# Patient Record
Sex: FEMALE | Race: NATIVE HAWAIIAN OR OTHER PACIFIC ISLANDER
[De-identification: names, ages, dates, MRNs, and addresses within clinical notes are randomized per-mention and may not be internally consistent; named-entity substitution may affect disease eponyms.]

---

## 2017-06-26 ENCOUNTER — HOSPITAL ENCOUNTER (OUTPATIENT)
Dept: HOSPITAL 67 - LABW | Age: 67
Discharge: HOME | End: 2017-06-26
Attending: INTERNAL MEDICINE
Payer: COMMERCIAL

## 2017-06-26 DIAGNOSIS — E11.9: Primary | ICD-10-CM

## 2017-06-26 DIAGNOSIS — E55.9: ICD-10-CM

## 2017-06-26 LAB
LDLC SERPL-MCNC: 102 MG/DL (ref 0–?)
PLATELET # BLD: 264 K/UL (ref 152–353)
POTASSIUM SERPL-SCNC: 4 MMOL/L (ref 3.6–5.2)
SODIUM SERPL-SCNC: 141 MMOL/L (ref 136–145)

## 2017-06-26 PROCEDURE — 85027 COMPLETE CBC AUTOMATED: CPT

## 2017-06-26 PROCEDURE — 82043 UR ALBUMIN QUANTITATIVE: CPT

## 2017-06-26 PROCEDURE — 82306 VITAMIN D 25 HYDROXY: CPT

## 2017-06-26 PROCEDURE — 81000 URINALYSIS NONAUTO W/SCOPE: CPT

## 2017-06-26 PROCEDURE — 80053 COMPREHEN METABOLIC PANEL: CPT

## 2017-06-26 PROCEDURE — 84443 ASSAY THYROID STIM HORMONE: CPT

## 2017-06-26 PROCEDURE — 80061 LIPID PANEL: CPT

## 2017-06-26 PROCEDURE — 83036 HEMOGLOBIN GLYCOSYLATED A1C: CPT

## 2017-06-26 PROCEDURE — 82570 ASSAY OF URINE CREATININE: CPT

## 2017-06-26 PROCEDURE — 36415 COLL VENOUS BLD VENIPUNCTURE: CPT

## 2017-07-10 ENCOUNTER — HOSPITAL ENCOUNTER (OUTPATIENT)
Dept: HOSPITAL 67 - MAMMO | Age: 67
Discharge: HOME | End: 2017-07-10
Attending: INTERNAL MEDICINE
Payer: COMMERCIAL

## 2017-07-10 DIAGNOSIS — M85.88: ICD-10-CM

## 2017-07-10 DIAGNOSIS — Z12.31: Primary | ICD-10-CM

## 2017-08-07 ENCOUNTER — HOSPITAL ENCOUNTER (OUTPATIENT)
Dept: HOSPITAL 67 - US | Age: 67
Discharge: HOME | End: 2017-08-07
Attending: INTERNAL MEDICINE
Payer: COMMERCIAL

## 2017-08-07 DIAGNOSIS — M79.604: Primary | ICD-10-CM

## 2017-08-22 ENCOUNTER — HOSPITAL ENCOUNTER (OUTPATIENT)
Dept: HOSPITAL 67 - INF | Age: 67
Discharge: HOME | End: 2017-08-22
Attending: INTERNAL MEDICINE
Payer: COMMERCIAL

## 2017-08-22 VITALS — WEIGHT: 181 LBS | BODY MASS INDEX: 33.31 KG/M2 | HEIGHT: 62 IN

## 2017-08-22 DIAGNOSIS — E86.0: Primary | ICD-10-CM

## 2017-08-22 LAB
PLATELET # BLD: 230 K/UL (ref 152–353)
POTASSIUM SERPL-SCNC: 3.4 MMOL/L (ref 3.6–5.2)
SODIUM SERPL-SCNC: 138 MMOL/L (ref 136–145)

## 2017-08-22 PROCEDURE — 96366 THER/PROPH/DIAG IV INF ADDON: CPT

## 2017-08-22 PROCEDURE — 82150 ASSAY OF AMYLASE: CPT

## 2017-08-22 PROCEDURE — 80053 COMPREHEN METABOLIC PANEL: CPT

## 2017-08-22 PROCEDURE — 85027 COMPLETE CBC AUTOMATED: CPT

## 2017-08-22 PROCEDURE — 96361 HYDRATE IV INFUSION ADD-ON: CPT

## 2017-08-22 PROCEDURE — 96376 TX/PRO/DX INJ SAME DRUG ADON: CPT

## 2017-08-22 PROCEDURE — 81000 URINALYSIS NONAUTO W/SCOPE: CPT

## 2017-08-22 PROCEDURE — 36591 DRAW BLOOD OFF VENOUS DEVICE: CPT

## 2017-08-22 PROCEDURE — 96375 TX/PRO/DX INJ NEW DRUG ADDON: CPT

## 2017-08-22 PROCEDURE — 83690 ASSAY OF LIPASE: CPT

## 2017-08-22 PROCEDURE — 96365 THER/PROPH/DIAG IV INF INIT: CPT

## 2017-08-25 ENCOUNTER — HOSPITAL ENCOUNTER (OUTPATIENT)
Dept: HOSPITAL 24 - RAD | Age: 67
End: 2017-08-25
Attending: INTERNAL MEDICINE
Payer: COMMERCIAL

## 2017-08-25 DIAGNOSIS — R10.84: Primary | ICD-10-CM

## 2017-08-25 LAB
BUN SERPL-MCNC: 11 MG/DL (ref 7–18)
CREAT SERPL-MCNC: 0.69 MG/DL (ref 0.55–1.02)

## 2017-08-25 PROCEDURE — 74177 CT ABD & PELVIS W/CONTRAST: CPT

## 2017-08-25 PROCEDURE — 84520 ASSAY OF UREA NITROGEN: CPT

## 2017-08-25 PROCEDURE — 36415 COLL VENOUS BLD VENIPUNCTURE: CPT

## 2017-08-25 PROCEDURE — 82565 ASSAY OF CREATININE: CPT

## 2017-08-25 NOTE — CT
CT abdomen and pelvis with contrast



Indication: Abdominal pain, nausea and vomiting



Technique: Helical CT images of the abdomen and pelvis were obtained with IV contrast. Reformatted im
ages in the coronal and sagittal planes were also generated for review.



Comparison: None



Findings: There is a 3 mm nodule within the left lower lobe (axial image 2, series 5). No aggressive 
osseous lesions are identified.



The gallbladder is surgically absent. There are multiple hypoenhancing lesions throughout the left an
d right hepatic lobes, compatible with metastases. There is a 3.0 x 2.5 cm hypoenhancing mass with pa
ncreatic head/neck junction with moderate associated atrophy of the pancreatic body and tail as well 
as mild pancreatic duct dilatation. The lesion partially encapsulates and narrows the splenic vein ne
ar the confluence without associated occlusion. The portal vein and SMV are patent. No arterial vascu
lar invasion is seen.



There is a small indeterminate focus of hypoattenuation within the spleen (image 17, series 4). The a
drenal glands are unremarkable. Small hypoattenuating lesions within the upper poles of both kidneys 
are too small to characterize but likely reflects cysts. Prior partial colectomy changes are noted. T
he remaining GI tract, including the appendix is normal. There is moderate calcification of the nonan
eurysmal aortoiliac system. The urinary bladder is normal. The patient is post hysterectomy. No free 
air, free fluid or lymphadenopathy is identified.



Impression: 

1. Hypoenhancing 3 cm lesion within the pancreatic head/neck junction is most compatible with pancrea
tic adenocarcinoma. The lesion partially encapsulates and narrows the splenic vein near the portal co
nfluence without associated occlusion or thrombosis.

2. Multiple hypoenhancing hepatic lesions, compatible with metastases.

3. Indeterminate 3 mm nodule within the left lower lobe, possibly reflecting metastatic disease. Rich
mmend further evaluation with contrast enhanced CT chest.

4. Hypoattenuating focus within the spleen is also indeterminate but suspicious for metastasis.







Reported By:Electronically Signed by BANDAR RODGERS MD at 8/25/2017 2:12:56 PM

## 2017-08-29 ENCOUNTER — HOSPITAL ENCOUNTER (OUTPATIENT)
Dept: HOSPITAL 67 - MRI | Age: 67
Discharge: HOME | End: 2017-08-29
Attending: SPECIALIST
Payer: COMMERCIAL

## 2017-08-29 DIAGNOSIS — M54.5: Primary | ICD-10-CM

## 2017-08-29 DIAGNOSIS — M48.06: ICD-10-CM

## 2017-09-06 ENCOUNTER — HOSPITAL ENCOUNTER (OUTPATIENT)
Dept: HOSPITAL 67 - LABW | Age: 67
Discharge: HOME | End: 2017-09-06
Attending: INTERNAL MEDICINE
Payer: COMMERCIAL

## 2017-09-06 DIAGNOSIS — C25.9: Primary | ICD-10-CM

## 2017-09-06 PROCEDURE — 86316 IMMUNOASSAY TUMOR OTHER: CPT

## 2017-09-06 PROCEDURE — 80076 HEPATIC FUNCTION PANEL: CPT

## 2017-09-06 PROCEDURE — 36415 COLL VENOUS BLD VENIPUNCTURE: CPT

## 2017-10-02 ENCOUNTER — HOSPITAL ENCOUNTER (OUTPATIENT)
Dept: HOSPITAL 67 - LABW | Age: 67
Discharge: HOME | End: 2017-10-02
Payer: COMMERCIAL

## 2017-10-02 DIAGNOSIS — C25.0: Primary | ICD-10-CM

## 2017-10-02 LAB
PLATELET # BLD: 185 K/UL (ref 152–353)
POTASSIUM SERPL-SCNC: 3.3 MMOL/L (ref 3.6–5.2)
SODIUM SERPL-SCNC: 137 MMOL/L (ref 136–145)

## 2017-10-02 PROCEDURE — 80053 COMPREHEN METABOLIC PANEL: CPT

## 2017-10-02 PROCEDURE — 36415 COLL VENOUS BLD VENIPUNCTURE: CPT

## 2017-10-02 PROCEDURE — 85027 COMPLETE CBC AUTOMATED: CPT

## 2017-10-16 ENCOUNTER — HOSPITAL ENCOUNTER (OUTPATIENT)
Dept: HOSPITAL 67 - LABW | Age: 67
Discharge: HOME | End: 2017-10-16
Payer: COMMERCIAL

## 2017-10-16 DIAGNOSIS — C25.0: Primary | ICD-10-CM

## 2017-10-16 LAB
PLATELET # BLD: 189 K/UL (ref 152–353)
POTASSIUM SERPL-SCNC: 4.2 MMOL/L (ref 3.6–5.2)
SODIUM SERPL-SCNC: 137 MMOL/L (ref 136–145)

## 2017-10-16 PROCEDURE — 85027 COMPLETE CBC AUTOMATED: CPT

## 2017-10-16 PROCEDURE — 80053 COMPREHEN METABOLIC PANEL: CPT

## 2017-10-16 PROCEDURE — 36415 COLL VENOUS BLD VENIPUNCTURE: CPT

## 2017-10-17 ENCOUNTER — HOSPITAL ENCOUNTER (OUTPATIENT)
Dept: HOSPITAL 67 - LABW | Age: 67
Discharge: HOME | End: 2017-10-17
Attending: INTERNAL MEDICINE
Payer: COMMERCIAL

## 2017-10-17 DIAGNOSIS — Z00.00: ICD-10-CM

## 2017-10-17 DIAGNOSIS — E11.9: Primary | ICD-10-CM

## 2017-10-17 LAB
LDLC SERPL-MCNC: 52 MG/DL (ref 0–?)
PLATELET # BLD: 232 K/UL (ref 152–353)
POTASSIUM SERPL-SCNC: 3.6 MMOL/L (ref 3.6–5.2)
SODIUM SERPL-SCNC: 140 MMOL/L (ref 136–145)

## 2017-10-17 PROCEDURE — 83036 HEMOGLOBIN GLYCOSYLATED A1C: CPT

## 2017-10-17 PROCEDURE — 84439 ASSAY OF FREE THYROXINE: CPT

## 2017-10-17 PROCEDURE — 80061 LIPID PANEL: CPT

## 2017-10-17 PROCEDURE — 85027 COMPLETE CBC AUTOMATED: CPT

## 2017-10-17 PROCEDURE — 36415 COLL VENOUS BLD VENIPUNCTURE: CPT

## 2017-10-17 PROCEDURE — 80053 COMPREHEN METABOLIC PANEL: CPT

## 2017-10-17 PROCEDURE — 81000 URINALYSIS NONAUTO W/SCOPE: CPT

## 2017-10-17 PROCEDURE — 84443 ASSAY THYROID STIM HORMONE: CPT

## 2017-10-31 ENCOUNTER — HOSPITAL ENCOUNTER (OUTPATIENT)
Dept: HOSPITAL 67 - LABW | Age: 67
Discharge: HOME | End: 2017-10-31
Payer: COMMERCIAL

## 2017-10-31 DIAGNOSIS — R19.7: ICD-10-CM

## 2017-10-31 DIAGNOSIS — C25.0: Primary | ICD-10-CM

## 2017-10-31 LAB
PLATELET # BLD: 105 K/UL (ref 152–353)
POTASSIUM SERPL-SCNC: 3.8 MMOL/L (ref 3.6–5.2)
SODIUM SERPL-SCNC: 135 MMOL/L (ref 136–145)

## 2017-10-31 PROCEDURE — 80053 COMPREHEN METABOLIC PANEL: CPT

## 2017-10-31 PROCEDURE — 36415 COLL VENOUS BLD VENIPUNCTURE: CPT

## 2017-10-31 PROCEDURE — 85027 COMPLETE CBC AUTOMATED: CPT

## 2017-11-27 ENCOUNTER — HOSPITAL ENCOUNTER (OUTPATIENT)
Dept: HOSPITAL 67 - LABW | Age: 67
Discharge: HOME | End: 2017-11-27
Payer: COMMERCIAL

## 2017-11-27 DIAGNOSIS — C25.0: Primary | ICD-10-CM

## 2017-11-27 LAB
PLATELET # BLD: 93 K/UL (ref 152–353)
POTASSIUM SERPL-SCNC: 4.2 MMOL/L (ref 3.6–5.2)
SODIUM SERPL-SCNC: 135 MMOL/L (ref 136–145)

## 2017-11-27 PROCEDURE — 85027 COMPLETE CBC AUTOMATED: CPT

## 2017-11-27 PROCEDURE — 36415 COLL VENOUS BLD VENIPUNCTURE: CPT

## 2017-11-27 PROCEDURE — 86316 IMMUNOASSAY TUMOR OTHER: CPT

## 2017-11-27 PROCEDURE — 80053 COMPREHEN METABOLIC PANEL: CPT

## 2017-12-11 ENCOUNTER — HOSPITAL ENCOUNTER (OUTPATIENT)
Dept: HOSPITAL 67 - LABW | Age: 67
Discharge: HOME | End: 2017-12-11
Payer: COMMERCIAL

## 2017-12-11 DIAGNOSIS — C25.0: Primary | ICD-10-CM

## 2017-12-11 LAB
PLATELET # BLD: 52 K/UL (ref 152–353)
POTASSIUM SERPL-SCNC: 4.1 MMOL/L (ref 3.6–5.2)
SODIUM SERPL-SCNC: 137 MMOL/L (ref 136–145)

## 2017-12-11 PROCEDURE — 36415 COLL VENOUS BLD VENIPUNCTURE: CPT

## 2017-12-11 PROCEDURE — 80053 COMPREHEN METABOLIC PANEL: CPT

## 2017-12-11 PROCEDURE — 85027 COMPLETE CBC AUTOMATED: CPT

## 2017-12-11 PROCEDURE — 85007 BL SMEAR W/DIFF WBC COUNT: CPT

## 2017-12-26 ENCOUNTER — HOSPITAL ENCOUNTER (OUTPATIENT)
Dept: HOSPITAL 67 - LABW | Age: 67
Discharge: HOME | End: 2017-12-26
Payer: COMMERCIAL

## 2017-12-26 DIAGNOSIS — C25.0: Primary | ICD-10-CM

## 2017-12-26 LAB
PLATELET # BLD: 57 K/UL (ref 152–353)
POTASSIUM SERPL-SCNC: 3.7 MMOL/L (ref 3.6–5.2)
SODIUM SERPL-SCNC: 134 MMOL/L (ref 136–145)

## 2017-12-26 PROCEDURE — 80053 COMPREHEN METABOLIC PANEL: CPT

## 2017-12-26 PROCEDURE — 85027 COMPLETE CBC AUTOMATED: CPT

## 2017-12-26 PROCEDURE — 36415 COLL VENOUS BLD VENIPUNCTURE: CPT

## 2018-01-15 ENCOUNTER — HOSPITAL ENCOUNTER (OUTPATIENT)
Dept: HOSPITAL 67 - LABW | Age: 68
Discharge: HOME | End: 2018-01-15
Payer: COMMERCIAL

## 2018-01-15 DIAGNOSIS — I87.2: Primary | ICD-10-CM

## 2018-01-15 LAB
PLATELET # BLD: 121 K/UL (ref 152–353)
POTASSIUM SERPL-SCNC: 3.8 MMOL/L (ref 3.6–5.2)
SODIUM SERPL-SCNC: 135 MMOL/L (ref 136–145)

## 2018-01-15 PROCEDURE — 85027 COMPLETE CBC AUTOMATED: CPT

## 2018-01-15 PROCEDURE — 86316 IMMUNOASSAY TUMOR OTHER: CPT

## 2018-01-15 PROCEDURE — 80053 COMPREHEN METABOLIC PANEL: CPT

## 2018-01-15 PROCEDURE — 36415 COLL VENOUS BLD VENIPUNCTURE: CPT

## 2018-01-30 ENCOUNTER — HOSPITAL ENCOUNTER (OUTPATIENT)
Dept: HOSPITAL 67 - LABW | Age: 68
Discharge: HOME | End: 2018-01-30
Payer: COMMERCIAL

## 2018-01-30 DIAGNOSIS — C25.0: Primary | ICD-10-CM

## 2018-01-30 LAB
PLATELET # BLD: 50 K/UL (ref 152–353)
POTASSIUM SERPL-SCNC: 3.7 MMOL/L (ref 3.6–5.2)
SODIUM SERPL-SCNC: 137 MMOL/L (ref 136–145)

## 2018-01-30 PROCEDURE — 85027 COMPLETE CBC AUTOMATED: CPT

## 2018-01-30 PROCEDURE — 36415 COLL VENOUS BLD VENIPUNCTURE: CPT

## 2018-01-30 PROCEDURE — 80053 COMPREHEN METABOLIC PANEL: CPT

## 2018-02-13 ENCOUNTER — HOSPITAL ENCOUNTER (OUTPATIENT)
Dept: HOSPITAL 67 - LABW | Age: 68
Discharge: HOME | End: 2018-02-13
Payer: COMMERCIAL

## 2018-02-13 DIAGNOSIS — C25.0: Primary | ICD-10-CM

## 2018-02-13 LAB
PLATELET # BLD: 62 K/UL (ref 152–353)
POTASSIUM SERPL-SCNC: 3.6 MMOL/L (ref 3.6–5.2)
SODIUM SERPL-SCNC: 135 MMOL/L (ref 136–145)

## 2018-02-13 PROCEDURE — 36415 COLL VENOUS BLD VENIPUNCTURE: CPT

## 2018-02-13 PROCEDURE — 85027 COMPLETE CBC AUTOMATED: CPT

## 2018-02-13 PROCEDURE — 80053 COMPREHEN METABOLIC PANEL: CPT

## 2018-02-26 ENCOUNTER — HOSPITAL ENCOUNTER (OUTPATIENT)
Dept: HOSPITAL 67 - LABW | Age: 68
Discharge: HOME | End: 2018-02-26
Payer: COMMERCIAL

## 2018-02-26 DIAGNOSIS — C25.0: Primary | ICD-10-CM

## 2018-02-26 LAB
PLATELET # BLD: 36 K/UL (ref 152–353)
POTASSIUM SERPL-SCNC: 2.9 MMOL/L (ref 3.6–5.2)
SODIUM SERPL-SCNC: 135 MMOL/L (ref 136–145)

## 2018-02-26 PROCEDURE — 36415 COLL VENOUS BLD VENIPUNCTURE: CPT

## 2018-02-26 PROCEDURE — 85027 COMPLETE CBC AUTOMATED: CPT

## 2018-02-26 PROCEDURE — 80053 COMPREHEN METABOLIC PANEL: CPT

## 2018-03-05 ENCOUNTER — HOSPITAL ENCOUNTER (OUTPATIENT)
Dept: HOSPITAL 67 - LABW | Age: 68
Discharge: HOME | End: 2018-03-05
Payer: COMMERCIAL

## 2018-03-05 DIAGNOSIS — C25.0: Primary | ICD-10-CM

## 2018-03-05 LAB
PLATELET # BLD: 117 K/UL (ref 152–353)
POTASSIUM SERPL-SCNC: 2.7 MMOL/L (ref 3.6–5.2)
SODIUM SERPL-SCNC: 136 MMOL/L (ref 136–145)

## 2018-03-05 PROCEDURE — 36415 COLL VENOUS BLD VENIPUNCTURE: CPT

## 2018-03-05 PROCEDURE — 80053 COMPREHEN METABOLIC PANEL: CPT

## 2018-03-05 PROCEDURE — 85027 COMPLETE CBC AUTOMATED: CPT

## 2018-03-13 ENCOUNTER — HOSPITAL ENCOUNTER (OUTPATIENT)
Dept: HOSPITAL 67 - LAB | Age: 68
Discharge: HOME | End: 2018-03-13
Attending: PHYSICIAN ASSISTANT
Payer: COMMERCIAL

## 2018-03-13 DIAGNOSIS — R19.7: Primary | ICD-10-CM

## 2018-03-13 PROCEDURE — 87015 SPECIMEN INFECT AGNT CONCNTJ: CPT

## 2018-03-13 PROCEDURE — 87899 AGENT NOS ASSAY W/OPTIC: CPT

## 2018-03-13 PROCEDURE — 87328 CRYPTOSPORIDIUM AG IA: CPT

## 2018-03-13 PROCEDURE — 87329 GIARDIA AG IA: CPT

## 2018-03-13 PROCEDURE — 87045 FECES CULTURE AEROBIC BACT: CPT

## 2018-03-13 PROCEDURE — 87324 CLOSTRIDIUM AG IA: CPT

## 2018-03-13 PROCEDURE — 87449 NOS EACH ORGANISM AG IA: CPT

## 2018-03-14 ENCOUNTER — HOSPITAL ENCOUNTER (OUTPATIENT)
Dept: HOSPITAL 67 - LABW | Age: 68
LOS: 1 days | Discharge: HOME | End: 2018-03-15
Payer: COMMERCIAL

## 2018-03-14 DIAGNOSIS — C25.0: Primary | ICD-10-CM

## 2018-03-14 LAB
PLATELET # BLD: 40 K/UL (ref 152–353)
POTASSIUM SERPL-SCNC: 3.6 MMOL/L (ref 3.6–5.2)
SODIUM SERPL-SCNC: 133 MMOL/L (ref 136–145)

## 2018-03-14 PROCEDURE — 36415 COLL VENOUS BLD VENIPUNCTURE: CPT

## 2018-03-14 PROCEDURE — 85027 COMPLETE CBC AUTOMATED: CPT

## 2018-03-14 PROCEDURE — 80053 COMPREHEN METABOLIC PANEL: CPT

## 2018-03-16 ENCOUNTER — HOSPITAL ENCOUNTER (OUTPATIENT)
Dept: HOSPITAL 67 - INF | Age: 68
Discharge: HOME | End: 2018-03-16
Attending: INTERNAL MEDICINE
Payer: COMMERCIAL

## 2018-03-16 VITALS — WEIGHT: 163 LBS | HEIGHT: 62 IN | BODY MASS INDEX: 30 KG/M2

## 2018-03-16 DIAGNOSIS — E87.8: Primary | ICD-10-CM

## 2018-03-16 PROCEDURE — 96365 THER/PROPH/DIAG IV INF INIT: CPT

## 2018-03-16 PROCEDURE — 96366 THER/PROPH/DIAG IV INF ADDON: CPT

## 2018-03-28 ENCOUNTER — HOSPITAL ENCOUNTER (OUTPATIENT)
Dept: HOSPITAL 67 - LABW | Age: 68
Discharge: HOME | End: 2018-03-28
Payer: COMMERCIAL

## 2018-03-28 DIAGNOSIS — C25.0: Primary | ICD-10-CM

## 2018-03-28 LAB
PLATELET # BLD: 189 K/UL (ref 152–353)
POTASSIUM SERPL-SCNC: 2.6 MMOL/L (ref 3.6–5.2)
SODIUM SERPL-SCNC: 136 MMOL/L (ref 136–145)

## 2018-03-28 PROCEDURE — 36415 COLL VENOUS BLD VENIPUNCTURE: CPT

## 2018-03-28 PROCEDURE — 80053 COMPREHEN METABOLIC PANEL: CPT

## 2018-03-28 PROCEDURE — 85027 COMPLETE CBC AUTOMATED: CPT

## 2018-04-19 ENCOUNTER — HOSPITAL ENCOUNTER (OUTPATIENT)
Dept: HOSPITAL 67 - NM | Age: 68
Discharge: HOME | End: 2018-04-19
Attending: INTERNAL MEDICINE
Payer: COMMERCIAL

## 2018-04-19 DIAGNOSIS — C25.0: ICD-10-CM

## 2018-04-19 DIAGNOSIS — M79.671: Primary | ICD-10-CM

## 2018-04-19 PROCEDURE — A9561 TC99M OXIDRONATE: HCPCS

## 2018-05-28 ENCOUNTER — HOSPITAL ENCOUNTER (OUTPATIENT)
Dept: HOSPITAL 67 - LABW | Age: 68
Discharge: HOME | End: 2018-05-28
Payer: COMMERCIAL

## 2018-05-28 DIAGNOSIS — C25.0: Primary | ICD-10-CM

## 2018-05-28 LAB
PLATELET # BLD: 219 K/UL (ref 152–353)
POTASSIUM SERPL-SCNC: 4.5 MMOL/L (ref 3.6–5.2)
SODIUM SERPL-SCNC: 138 MMOL/L (ref 136–145)

## 2018-05-28 PROCEDURE — 86316 IMMUNOASSAY TUMOR OTHER: CPT

## 2018-05-28 PROCEDURE — 36415 COLL VENOUS BLD VENIPUNCTURE: CPT

## 2018-05-28 PROCEDURE — 85027 COMPLETE CBC AUTOMATED: CPT

## 2018-05-28 PROCEDURE — 80053 COMPREHEN METABOLIC PANEL: CPT

## 2018-06-25 ENCOUNTER — HOSPITAL ENCOUNTER (OUTPATIENT)
Dept: HOSPITAL 67 - LABW | Age: 68
Discharge: HOME | End: 2018-06-25
Attending: INTERNAL MEDICINE
Payer: COMMERCIAL

## 2018-06-25 DIAGNOSIS — E11.9: Primary | ICD-10-CM

## 2018-06-25 LAB
LDLC SERPL-MCNC: 75 MG/DL (ref 0–?)
PLATELET # BLD: 222 K/UL (ref 152–353)
POTASSIUM SERPL-SCNC: 3.8 MMOL/L (ref 3.6–5.2)
SODIUM SERPL-SCNC: 139 MMOL/L (ref 136–145)

## 2018-06-25 PROCEDURE — 84443 ASSAY THYROID STIM HORMONE: CPT

## 2018-06-25 PROCEDURE — 80061 LIPID PANEL: CPT

## 2018-06-25 PROCEDURE — 84439 ASSAY OF FREE THYROXINE: CPT

## 2018-06-25 PROCEDURE — 81000 URINALYSIS NONAUTO W/SCOPE: CPT

## 2018-06-25 PROCEDURE — 36415 COLL VENOUS BLD VENIPUNCTURE: CPT

## 2018-06-25 PROCEDURE — 83036 HEMOGLOBIN GLYCOSYLATED A1C: CPT

## 2018-06-25 PROCEDURE — 80053 COMPREHEN METABOLIC PANEL: CPT

## 2018-06-25 PROCEDURE — 85027 COMPLETE CBC AUTOMATED: CPT

## 2018-07-06 ENCOUNTER — HOSPITAL ENCOUNTER (OUTPATIENT)
Dept: HOSPITAL 67 - INF | Age: 68
Discharge: HOME | End: 2018-07-06
Attending: FAMILY MEDICINE
Payer: COMMERCIAL

## 2018-07-06 DIAGNOSIS — Z95.828: Primary | ICD-10-CM

## 2018-07-06 PROCEDURE — 96374 THER/PROPH/DIAG INJ IV PUSH: CPT

## 2018-07-12 ENCOUNTER — HOSPITAL ENCOUNTER (OUTPATIENT)
Dept: HOSPITAL 67 - MAMMO | Age: 68
Discharge: HOME | End: 2018-07-12
Attending: INTERNAL MEDICINE
Payer: COMMERCIAL

## 2018-07-12 DIAGNOSIS — Z12.31: Primary | ICD-10-CM

## 2018-08-09 ENCOUNTER — HOSPITAL ENCOUNTER (OUTPATIENT)
Dept: HOSPITAL 67 - INF | Age: 68
Discharge: HOME | End: 2018-08-09
Attending: FAMILY MEDICINE
Payer: COMMERCIAL

## 2018-08-09 DIAGNOSIS — Z95.828: Primary | ICD-10-CM

## 2018-08-09 PROCEDURE — 96374 THER/PROPH/DIAG INJ IV PUSH: CPT

## 2018-08-31 ENCOUNTER — HOSPITAL ENCOUNTER (EMERGENCY)
Dept: HOSPITAL 67 - ED | Age: 68
Discharge: HOME | End: 2018-08-31
Payer: COMMERCIAL

## 2018-08-31 VITALS — HEIGHT: 64 IN | WEIGHT: 160 LBS | BODY MASS INDEX: 27.31 KG/M2

## 2018-08-31 VITALS — SYSTOLIC BLOOD PRESSURE: 122 MMHG | DIASTOLIC BLOOD PRESSURE: 50 MMHG

## 2018-08-31 VITALS — TEMPERATURE: 97.8 F

## 2018-08-31 DIAGNOSIS — Z79.899: ICD-10-CM

## 2018-08-31 DIAGNOSIS — G45.8: Primary | ICD-10-CM

## 2018-08-31 DIAGNOSIS — R00.1: ICD-10-CM

## 2018-08-31 DIAGNOSIS — Z51.81: ICD-10-CM

## 2018-08-31 LAB
APTT BLD: 22.3 SECONDS (ref 24.5–33.6)
PLATELET # BLD: 192 K/UL (ref 152–353)
POTASSIUM SERPL-SCNC: 4.8 MMOL/L (ref 3.6–5.2)
SODIUM SERPL-SCNC: 139 MMOL/L (ref 136–145)

## 2018-08-31 PROCEDURE — 85027 COMPLETE CBC AUTOMATED: CPT

## 2018-08-31 PROCEDURE — 85730 THROMBOPLASTIN TIME PARTIAL: CPT

## 2018-08-31 PROCEDURE — 80053 COMPREHEN METABOLIC PANEL: CPT

## 2018-08-31 PROCEDURE — 99283 EMERGENCY DEPT VISIT LOW MDM: CPT

## 2018-08-31 PROCEDURE — 36415 COLL VENOUS BLD VENIPUNCTURE: CPT

## 2018-08-31 PROCEDURE — 85379 FIBRIN DEGRADATION QUANT: CPT

## 2018-08-31 PROCEDURE — 93005 ELECTROCARDIOGRAM TRACING: CPT

## 2018-08-31 PROCEDURE — 85610 PROTHROMBIN TIME: CPT

## 2018-09-11 ENCOUNTER — HOSPITAL ENCOUNTER (OUTPATIENT)
Dept: HOSPITAL 67 - RESP | Age: 68
Discharge: HOME | End: 2018-09-11
Attending: INTERNAL MEDICINE
Payer: COMMERCIAL

## 2018-09-11 DIAGNOSIS — G45.9: Primary | ICD-10-CM

## 2018-09-11 PROCEDURE — 93306 TTE W/DOPPLER COMPLETE: CPT

## 2018-09-14 ENCOUNTER — HOSPITAL ENCOUNTER (OUTPATIENT)
Dept: HOSPITAL 67 - MRI | Age: 68
Discharge: HOME | End: 2018-09-14
Attending: INTERNAL MEDICINE
Payer: COMMERCIAL

## 2018-09-14 DIAGNOSIS — G45.9: Primary | ICD-10-CM

## 2018-10-04 ENCOUNTER — HOSPITAL ENCOUNTER (OUTPATIENT)
Dept: HOSPITAL 67 - LABW | Age: 68
Discharge: HOME | End: 2018-10-04
Attending: INTERNAL MEDICINE
Payer: COMMERCIAL

## 2018-10-04 DIAGNOSIS — C25.0: Primary | ICD-10-CM

## 2018-10-04 LAB
PLATELET # BLD: 175 K/UL (ref 152–353)
POTASSIUM SERPL-SCNC: 4.5 MMOL/L (ref 3.6–5.2)
SODIUM SERPL-SCNC: 140 MMOL/L (ref 136–145)

## 2018-10-04 PROCEDURE — 36415 COLL VENOUS BLD VENIPUNCTURE: CPT

## 2018-10-04 PROCEDURE — 85027 COMPLETE CBC AUTOMATED: CPT

## 2018-10-04 PROCEDURE — 86316 IMMUNOASSAY TUMOR OTHER: CPT

## 2018-10-04 PROCEDURE — 80053 COMPREHEN METABOLIC PANEL: CPT

## 2018-10-20 ENCOUNTER — HOSPITAL ENCOUNTER (EMERGENCY)
Dept: HOSPITAL 67 - ED | Age: 68
Discharge: HOME | End: 2018-10-20
Payer: COMMERCIAL

## 2018-10-20 VITALS — HEIGHT: 62 IN | WEIGHT: 157 LBS | BODY MASS INDEX: 28.89 KG/M2

## 2018-10-20 VITALS — TEMPERATURE: 98.1 F | SYSTOLIC BLOOD PRESSURE: 105 MMHG | DIASTOLIC BLOOD PRESSURE: 43 MMHG

## 2018-10-20 DIAGNOSIS — R31.9: ICD-10-CM

## 2018-10-20 DIAGNOSIS — R10.32: Primary | ICD-10-CM

## 2018-10-20 DIAGNOSIS — K59.09: ICD-10-CM

## 2018-10-20 PROCEDURE — 99283 EMERGENCY DEPT VISIT LOW MDM: CPT

## 2018-10-20 PROCEDURE — 81000 URINALYSIS NONAUTO W/SCOPE: CPT

## 2018-10-26 ENCOUNTER — HOSPITAL ENCOUNTER (EMERGENCY)
Dept: HOSPITAL 67 - ED | Age: 68
Discharge: HOME | End: 2018-10-26
Payer: COMMERCIAL

## 2018-10-26 VITALS — SYSTOLIC BLOOD PRESSURE: 112 MMHG | DIASTOLIC BLOOD PRESSURE: 59 MMHG

## 2018-10-26 VITALS — TEMPERATURE: 97.9 F

## 2018-10-26 VITALS — BODY MASS INDEX: 29.08 KG/M2 | WEIGHT: 158 LBS | HEIGHT: 62 IN

## 2018-10-26 DIAGNOSIS — E11.9: ICD-10-CM

## 2018-10-26 DIAGNOSIS — R50.9: Primary | ICD-10-CM

## 2018-10-26 DIAGNOSIS — C25.7: ICD-10-CM

## 2018-10-26 LAB
PLATELET # BLD: 97 K/UL (ref 152–353)
POTASSIUM SERPL-SCNC: 4.6 MMOL/L (ref 3.6–5.2)
SODIUM SERPL-SCNC: 134 MMOL/L (ref 136–145)

## 2018-10-26 PROCEDURE — 99283 EMERGENCY DEPT VISIT LOW MDM: CPT

## 2018-10-26 PROCEDURE — 83605 ASSAY OF LACTIC ACID: CPT

## 2018-10-26 PROCEDURE — 81000 URINALYSIS NONAUTO W/SCOPE: CPT

## 2018-10-26 PROCEDURE — 36415 COLL VENOUS BLD VENIPUNCTURE: CPT

## 2018-10-26 PROCEDURE — 80053 COMPREHEN METABOLIC PANEL: CPT

## 2018-10-26 PROCEDURE — 85027 COMPLETE CBC AUTOMATED: CPT

## 2018-11-06 ENCOUNTER — HOSPITAL ENCOUNTER (OUTPATIENT)
Dept: HOSPITAL 67 - LABW | Age: 68
Discharge: HOME | End: 2018-11-06
Attending: INTERNAL MEDICINE
Payer: COMMERCIAL

## 2018-11-06 DIAGNOSIS — C25.0: Primary | ICD-10-CM

## 2018-11-06 LAB
PLATELET # BLD: 195 K/UL (ref 152–353)
POTASSIUM SERPL-SCNC: 4.9 MMOL/L (ref 3.6–5.2)
SODIUM SERPL-SCNC: 138 MMOL/L (ref 136–145)

## 2018-11-06 PROCEDURE — 36415 COLL VENOUS BLD VENIPUNCTURE: CPT

## 2018-11-06 PROCEDURE — 80053 COMPREHEN METABOLIC PANEL: CPT

## 2018-11-06 PROCEDURE — 85027 COMPLETE CBC AUTOMATED: CPT

## 2018-11-28 ENCOUNTER — HOSPITAL ENCOUNTER (OUTPATIENT)
Dept: HOSPITAL 67 - LABW | Age: 68
Discharge: HOME | End: 2018-11-28
Attending: INTERNAL MEDICINE
Payer: COMMERCIAL

## 2018-11-28 DIAGNOSIS — C25.0: Primary | ICD-10-CM

## 2018-11-28 LAB
PLATELET # BLD: 66 K/UL (ref 152–353)
POTASSIUM SERPL-SCNC: 4.9 MMOL/L (ref 3.6–5.2)
SODIUM SERPL-SCNC: 134 MMOL/L (ref 136–145)

## 2018-11-28 PROCEDURE — 85027 COMPLETE CBC AUTOMATED: CPT

## 2018-11-28 PROCEDURE — 86316 IMMUNOASSAY TUMOR OTHER: CPT

## 2018-11-28 PROCEDURE — 80053 COMPREHEN METABOLIC PANEL: CPT

## 2018-12-03 ENCOUNTER — HOSPITAL ENCOUNTER (OUTPATIENT)
Dept: HOSPITAL 67 - LABW | Age: 68
Discharge: HOME | End: 2018-12-03
Attending: INTERNAL MEDICINE
Payer: COMMERCIAL

## 2018-12-03 DIAGNOSIS — E03.9: Primary | ICD-10-CM

## 2018-12-03 PROCEDURE — 36415 COLL VENOUS BLD VENIPUNCTURE: CPT

## 2018-12-03 PROCEDURE — 84439 ASSAY OF FREE THYROXINE: CPT

## 2018-12-03 PROCEDURE — 84443 ASSAY THYROID STIM HORMONE: CPT

## 2018-12-10 ENCOUNTER — HOSPITAL ENCOUNTER (OUTPATIENT)
Dept: HOSPITAL 67 - LABW | Age: 68
Discharge: HOME | End: 2018-12-10
Attending: NURSE PRACTITIONER
Payer: COMMERCIAL

## 2018-12-10 DIAGNOSIS — C25.0: Primary | ICD-10-CM

## 2018-12-10 LAB
PLATELET # BLD: 256 K/UL (ref 152–353)
POTASSIUM SERPL-SCNC: 4.3 MMOL/L (ref 3.6–5.2)
SODIUM SERPL-SCNC: 140 MMOL/L (ref 136–145)

## 2018-12-10 PROCEDURE — 36415 COLL VENOUS BLD VENIPUNCTURE: CPT

## 2018-12-10 PROCEDURE — 80053 COMPREHEN METABOLIC PANEL: CPT

## 2018-12-10 PROCEDURE — 85027 COMPLETE CBC AUTOMATED: CPT

## 2018-12-16 ENCOUNTER — HOSPITAL ENCOUNTER (EMERGENCY)
Dept: HOSPITAL 67 - ED | Age: 68
Discharge: HOME | End: 2018-12-16
Payer: COMMERCIAL

## 2018-12-16 VITALS — BODY MASS INDEX: 29.81 KG/M2 | HEIGHT: 62 IN | WEIGHT: 162 LBS

## 2018-12-16 VITALS — TEMPERATURE: 98.3 F | SYSTOLIC BLOOD PRESSURE: 145 MMHG | DIASTOLIC BLOOD PRESSURE: 42 MMHG

## 2018-12-16 DIAGNOSIS — Z98.890: ICD-10-CM

## 2018-12-16 DIAGNOSIS — R73.9: ICD-10-CM

## 2018-12-16 DIAGNOSIS — G45.8: Primary | ICD-10-CM

## 2018-12-16 LAB
APTT BLD: 21.6 SECONDS (ref 24.5–33.6)
PLATELET # BLD: 179 K/UL (ref 152–353)
POTASSIUM SERPL-SCNC: 4.8 MMOL/L (ref 3.6–5.2)
SODIUM SERPL-SCNC: 133 MMOL/L (ref 136–145)

## 2018-12-16 PROCEDURE — 84484 ASSAY OF TROPONIN QUANT: CPT

## 2018-12-16 PROCEDURE — 85610 PROTHROMBIN TIME: CPT

## 2018-12-16 PROCEDURE — 36415 COLL VENOUS BLD VENIPUNCTURE: CPT

## 2018-12-16 PROCEDURE — 80053 COMPREHEN METABOLIC PANEL: CPT

## 2018-12-16 PROCEDURE — 85027 COMPLETE CBC AUTOMATED: CPT

## 2018-12-16 PROCEDURE — 96374 THER/PROPH/DIAG INJ IV PUSH: CPT

## 2018-12-16 PROCEDURE — 85730 THROMBOPLASTIN TIME PARTIAL: CPT

## 2018-12-16 PROCEDURE — 93005 ELECTROCARDIOGRAM TRACING: CPT

## 2018-12-16 PROCEDURE — 82550 ASSAY OF CK (CPK): CPT

## 2018-12-16 PROCEDURE — 99284 EMERGENCY DEPT VISIT MOD MDM: CPT

## 2018-12-20 ENCOUNTER — HOSPITAL ENCOUNTER (OUTPATIENT)
Dept: HOSPITAL 67 - LABW | Age: 68
Discharge: HOME | End: 2018-12-20
Attending: INTERNAL MEDICINE
Payer: COMMERCIAL

## 2018-12-20 DIAGNOSIS — C25.0: Primary | ICD-10-CM

## 2018-12-20 LAB
PLATELET # BLD: 96 K/UL (ref 152–353)
POTASSIUM SERPL-SCNC: 4.5 MMOL/L (ref 3.6–5.2)
SODIUM SERPL-SCNC: 137 MMOL/L (ref 136–145)

## 2018-12-20 PROCEDURE — 85027 COMPLETE CBC AUTOMATED: CPT

## 2018-12-20 PROCEDURE — 80053 COMPREHEN METABOLIC PANEL: CPT

## 2018-12-20 PROCEDURE — 36415 COLL VENOUS BLD VENIPUNCTURE: CPT

## 2019-01-15 ENCOUNTER — HOSPITAL ENCOUNTER (OUTPATIENT)
Dept: HOSPITAL 67 - LABW | Age: 69
Discharge: HOME | End: 2019-01-15
Attending: INTERNAL MEDICINE
Payer: COMMERCIAL

## 2019-01-15 DIAGNOSIS — C25.0: Primary | ICD-10-CM

## 2019-01-15 LAB
PLATELET # BLD: 177 K/UL (ref 152–353)
POTASSIUM SERPL-SCNC: 4.1 MMOL/L (ref 3.6–5.2)
SODIUM SERPL-SCNC: 132 MMOL/L (ref 136–145)

## 2019-01-15 PROCEDURE — 80053 COMPREHEN METABOLIC PANEL: CPT

## 2019-01-15 PROCEDURE — 36415 COLL VENOUS BLD VENIPUNCTURE: CPT

## 2019-01-15 PROCEDURE — 85027 COMPLETE CBC AUTOMATED: CPT

## 2019-01-15 PROCEDURE — 86316 IMMUNOASSAY TUMOR OTHER: CPT

## 2019-01-22 ENCOUNTER — HOSPITAL ENCOUNTER (OUTPATIENT)
Dept: HOSPITAL 67 - LABW | Age: 69
Discharge: HOME | End: 2019-01-22
Attending: INTERNAL MEDICINE
Payer: COMMERCIAL

## 2019-01-22 DIAGNOSIS — C25.0: Primary | ICD-10-CM

## 2019-01-22 LAB
PLATELET # BLD: 155 K/UL (ref 152–353)
POTASSIUM SERPL-SCNC: 4.5 MMOL/L (ref 3.6–5.2)
SODIUM SERPL-SCNC: 138 MMOL/L (ref 136–145)

## 2019-01-22 PROCEDURE — 36415 COLL VENOUS BLD VENIPUNCTURE: CPT

## 2019-01-22 PROCEDURE — 85027 COMPLETE CBC AUTOMATED: CPT

## 2019-01-22 PROCEDURE — 80053 COMPREHEN METABOLIC PANEL: CPT

## 2019-02-03 ENCOUNTER — HOSPITAL ENCOUNTER (INPATIENT)
Dept: HOSPITAL 67 - ED | Age: 69
LOS: 18 days | Discharge: ANOTHER HOSPITAL | DRG: 64 | End: 2019-02-21
Attending: INTERNAL MEDICINE | Admitting: INTERNAL MEDICINE
Payer: COMMERCIAL

## 2019-02-03 VITALS — DIASTOLIC BLOOD PRESSURE: 53 MMHG | SYSTOLIC BLOOD PRESSURE: 136 MMHG

## 2019-02-03 VITALS — TEMPERATURE: 99.2 F | SYSTOLIC BLOOD PRESSURE: 152 MMHG | DIASTOLIC BLOOD PRESSURE: 48 MMHG

## 2019-02-03 VITALS
WEIGHT: 155.44 LBS | TEMPERATURE: 98 F | WEIGHT: 155.44 LBS | SYSTOLIC BLOOD PRESSURE: 118 MMHG | DIASTOLIC BLOOD PRESSURE: 47 MMHG | HEIGHT: 62 IN | HEIGHT: 62 IN | BODY MASS INDEX: 28.61 KG/M2 | BODY MASS INDEX: 28.61 KG/M2

## 2019-02-03 VITALS — DIASTOLIC BLOOD PRESSURE: 62 MMHG | TEMPERATURE: 97.9 F | SYSTOLIC BLOOD PRESSURE: 153 MMHG

## 2019-02-03 VITALS — DIASTOLIC BLOOD PRESSURE: 57 MMHG | SYSTOLIC BLOOD PRESSURE: 110 MMHG

## 2019-02-03 DIAGNOSIS — E46: ICD-10-CM

## 2019-02-03 DIAGNOSIS — R13.12: ICD-10-CM

## 2019-02-03 DIAGNOSIS — R47.01: ICD-10-CM

## 2019-02-03 DIAGNOSIS — C25.7: ICD-10-CM

## 2019-02-03 DIAGNOSIS — B96.1: ICD-10-CM

## 2019-02-03 DIAGNOSIS — E80.6: ICD-10-CM

## 2019-02-03 DIAGNOSIS — I63.412: Primary | ICD-10-CM

## 2019-02-03 DIAGNOSIS — I25.10: ICD-10-CM

## 2019-02-03 DIAGNOSIS — C79.89: ICD-10-CM

## 2019-02-03 DIAGNOSIS — E87.6: ICD-10-CM

## 2019-02-03 DIAGNOSIS — Z20.89: ICD-10-CM

## 2019-02-03 DIAGNOSIS — N39.0: ICD-10-CM

## 2019-02-03 DIAGNOSIS — I48.91: ICD-10-CM

## 2019-02-03 DIAGNOSIS — J18.8: ICD-10-CM

## 2019-02-03 DIAGNOSIS — E83.42: ICD-10-CM

## 2019-02-03 DIAGNOSIS — I10: ICD-10-CM

## 2019-02-03 DIAGNOSIS — Z79.4: ICD-10-CM

## 2019-02-03 DIAGNOSIS — E11.40: ICD-10-CM

## 2019-02-03 DIAGNOSIS — D69.6: ICD-10-CM

## 2019-02-03 DIAGNOSIS — G81.91: ICD-10-CM

## 2019-02-03 LAB
PLATELET # BLD: 217 K/UL (ref 152–353)
POTASSIUM SERPL-SCNC: 4.2 MMOL/L (ref 3.6–5.2)
SODIUM SERPL-SCNC: 136 MMOL/L (ref 136–145)

## 2019-02-03 PROCEDURE — P9016 RBC LEUKOCYTES REDUCED: HCPCS

## 2019-02-03 PROCEDURE — 36415 COLL VENOUS BLD VENIPUNCTURE: CPT

## 2019-02-03 PROCEDURE — 80202 ASSAY OF VANCOMYCIN: CPT

## 2019-02-03 PROCEDURE — 80053 COMPREHEN METABOLIC PANEL: CPT

## 2019-02-03 PROCEDURE — 81000 URINALYSIS NONAUTO W/SCOPE: CPT

## 2019-02-03 PROCEDURE — 94760 N-INVAS EAR/PLS OXIMETRY 1: CPT

## 2019-02-03 PROCEDURE — 86900 BLOOD TYPING SEROLOGIC ABO: CPT

## 2019-02-03 PROCEDURE — 87077 CULTURE AEROBIC IDENTIFY: CPT

## 2019-02-03 PROCEDURE — 86922 COMPATIBILITY TEST ANTIGLOB: CPT

## 2019-02-03 PROCEDURE — 86901 BLOOD TYPING SEROLOGIC RH(D): CPT

## 2019-02-03 PROCEDURE — 83735 ASSAY OF MAGNESIUM: CPT

## 2019-02-03 PROCEDURE — 86850 RBC ANTIBODY SCREEN: CPT

## 2019-02-03 PROCEDURE — 93005 ELECTROCARDIOGRAM TRACING: CPT

## 2019-02-03 PROCEDURE — 87186 SC STD MICRODIL/AGAR DIL: CPT

## 2019-02-03 PROCEDURE — 87088 URINE BACTERIA CULTURE: CPT

## 2019-02-03 PROCEDURE — 87086 URINE CULTURE/COLONY COUNT: CPT

## 2019-02-03 PROCEDURE — 99285 EMERGENCY DEPT VISIT HI MDM: CPT

## 2019-02-03 PROCEDURE — 82947 ASSAY GLUCOSE BLOOD QUANT: CPT

## 2019-02-03 PROCEDURE — 84484 ASSAY OF TROPONIN QUANT: CPT

## 2019-02-03 PROCEDURE — 82550 ASSAY OF CK (CPK): CPT

## 2019-02-03 PROCEDURE — 84100 ASSAY OF PHOSPHORUS: CPT

## 2019-02-03 PROCEDURE — 51702 INSERT TEMP BLADDER CATH: CPT

## 2019-02-03 PROCEDURE — A9576 INJ PROHANCE MULTIPACK: HCPCS

## 2019-02-03 PROCEDURE — 85027 COMPLETE CBC AUTOMATED: CPT

## 2019-02-04 VITALS — SYSTOLIC BLOOD PRESSURE: 150 MMHG | TEMPERATURE: 98.7 F | DIASTOLIC BLOOD PRESSURE: 52 MMHG

## 2019-02-04 VITALS — DIASTOLIC BLOOD PRESSURE: 53 MMHG | SYSTOLIC BLOOD PRESSURE: 168 MMHG | TEMPERATURE: 99.1 F

## 2019-02-04 VITALS — SYSTOLIC BLOOD PRESSURE: 141 MMHG | DIASTOLIC BLOOD PRESSURE: 36 MMHG

## 2019-02-04 VITALS — SYSTOLIC BLOOD PRESSURE: 152 MMHG | TEMPERATURE: 99.2 F | DIASTOLIC BLOOD PRESSURE: 48 MMHG

## 2019-02-04 VITALS — DIASTOLIC BLOOD PRESSURE: 49 MMHG | SYSTOLIC BLOOD PRESSURE: 145 MMHG

## 2019-02-04 VITALS — SYSTOLIC BLOOD PRESSURE: 161 MMHG | DIASTOLIC BLOOD PRESSURE: 59 MMHG

## 2019-02-04 VITALS — DIASTOLIC BLOOD PRESSURE: 45 MMHG | SYSTOLIC BLOOD PRESSURE: 149 MMHG | TEMPERATURE: 99.2 F

## 2019-02-04 VITALS — SYSTOLIC BLOOD PRESSURE: 154 MMHG | DIASTOLIC BLOOD PRESSURE: 51 MMHG

## 2019-02-04 VITALS — TEMPERATURE: 98.2 F | SYSTOLIC BLOOD PRESSURE: 124 MMHG | DIASTOLIC BLOOD PRESSURE: 48 MMHG

## 2019-02-04 VITALS — SYSTOLIC BLOOD PRESSURE: 150 MMHG | DIASTOLIC BLOOD PRESSURE: 59 MMHG

## 2019-02-04 VITALS — SYSTOLIC BLOOD PRESSURE: 146 MMHG | DIASTOLIC BLOOD PRESSURE: 53 MMHG

## 2019-02-04 VITALS — TEMPERATURE: 98.8 F | SYSTOLIC BLOOD PRESSURE: 167 MMHG | DIASTOLIC BLOOD PRESSURE: 62 MMHG

## 2019-02-04 VITALS — DIASTOLIC BLOOD PRESSURE: 48 MMHG | SYSTOLIC BLOOD PRESSURE: 156 MMHG

## 2019-02-04 VITALS — DIASTOLIC BLOOD PRESSURE: 50 MMHG | SYSTOLIC BLOOD PRESSURE: 150 MMHG

## 2019-02-04 VITALS — SYSTOLIC BLOOD PRESSURE: 155 MMHG | DIASTOLIC BLOOD PRESSURE: 52 MMHG

## 2019-02-04 VITALS — SYSTOLIC BLOOD PRESSURE: 136 MMHG | DIASTOLIC BLOOD PRESSURE: 42 MMHG | TEMPERATURE: 98.2 F

## 2019-02-04 VITALS — SYSTOLIC BLOOD PRESSURE: 168 MMHG | DIASTOLIC BLOOD PRESSURE: 61 MMHG | TEMPERATURE: 98.8 F

## 2019-02-04 VITALS — SYSTOLIC BLOOD PRESSURE: 152 MMHG | DIASTOLIC BLOOD PRESSURE: 50 MMHG

## 2019-02-04 VITALS — SYSTOLIC BLOOD PRESSURE: 167 MMHG | DIASTOLIC BLOOD PRESSURE: 52 MMHG

## 2019-02-04 VITALS — SYSTOLIC BLOOD PRESSURE: 167 MMHG | DIASTOLIC BLOOD PRESSURE: 56 MMHG

## 2019-02-04 VITALS — SYSTOLIC BLOOD PRESSURE: 157 MMHG | DIASTOLIC BLOOD PRESSURE: 61 MMHG

## 2019-02-04 VITALS — DIASTOLIC BLOOD PRESSURE: 60 MMHG | SYSTOLIC BLOOD PRESSURE: 164 MMHG | TEMPERATURE: 98.6 F

## 2019-02-04 VITALS — DIASTOLIC BLOOD PRESSURE: 41 MMHG | SYSTOLIC BLOOD PRESSURE: 150 MMHG | TEMPERATURE: 99.1 F

## 2019-02-04 VITALS — DIASTOLIC BLOOD PRESSURE: 48 MMHG | SYSTOLIC BLOOD PRESSURE: 161 MMHG

## 2019-02-04 VITALS — DIASTOLIC BLOOD PRESSURE: 45 MMHG | SYSTOLIC BLOOD PRESSURE: 141 MMHG

## 2019-02-04 LAB
PLATELET # BLD: 148 K/UL (ref 152–353)
POTASSIUM SERPL-SCNC: 4.1 MMOL/L (ref 3.6–5.2)
SODIUM SERPL-SCNC: 138 MMOL/L (ref 136–145)

## 2019-02-04 PROCEDURE — 30233N1 TRANSFUSION OF NONAUTOLOGOUS RED BLOOD CELLS INTO PERIPHERAL VEIN, PERCUTANEOUS APPROACH: ICD-10-PCS | Performed by: INTERNAL MEDICINE

## 2019-02-05 VITALS — DIASTOLIC BLOOD PRESSURE: 56 MMHG | SYSTOLIC BLOOD PRESSURE: 179 MMHG

## 2019-02-05 VITALS — TEMPERATURE: 99.6 F | SYSTOLIC BLOOD PRESSURE: 154 MMHG | DIASTOLIC BLOOD PRESSURE: 56 MMHG

## 2019-02-05 VITALS — DIASTOLIC BLOOD PRESSURE: 57 MMHG | SYSTOLIC BLOOD PRESSURE: 161 MMHG

## 2019-02-05 VITALS — DIASTOLIC BLOOD PRESSURE: 53 MMHG | SYSTOLIC BLOOD PRESSURE: 169 MMHG | TEMPERATURE: 98.5 F

## 2019-02-05 VITALS — DIASTOLIC BLOOD PRESSURE: 49 MMHG | SYSTOLIC BLOOD PRESSURE: 155 MMHG

## 2019-02-05 VITALS — SYSTOLIC BLOOD PRESSURE: 169 MMHG | TEMPERATURE: 98.5 F | DIASTOLIC BLOOD PRESSURE: 62 MMHG

## 2019-02-05 VITALS — DIASTOLIC BLOOD PRESSURE: 56 MMHG | TEMPERATURE: 98.7 F | SYSTOLIC BLOOD PRESSURE: 166 MMHG

## 2019-02-05 VITALS — TEMPERATURE: 97.6 F | SYSTOLIC BLOOD PRESSURE: 145 MMHG | DIASTOLIC BLOOD PRESSURE: 48 MMHG

## 2019-02-05 VITALS — DIASTOLIC BLOOD PRESSURE: 56 MMHG | SYSTOLIC BLOOD PRESSURE: 174 MMHG

## 2019-02-05 VITALS — TEMPERATURE: 98.6 F | SYSTOLIC BLOOD PRESSURE: 171 MMHG | DIASTOLIC BLOOD PRESSURE: 50 MMHG

## 2019-02-05 VITALS — SYSTOLIC BLOOD PRESSURE: 172 MMHG | DIASTOLIC BLOOD PRESSURE: 52 MMHG

## 2019-02-05 VITALS — TEMPERATURE: 98.7 F | DIASTOLIC BLOOD PRESSURE: 46 MMHG | SYSTOLIC BLOOD PRESSURE: 164 MMHG

## 2019-02-05 VITALS — SYSTOLIC BLOOD PRESSURE: 180 MMHG | DIASTOLIC BLOOD PRESSURE: 65 MMHG

## 2019-02-05 VITALS — DIASTOLIC BLOOD PRESSURE: 51 MMHG | SYSTOLIC BLOOD PRESSURE: 168 MMHG

## 2019-02-05 VITALS — SYSTOLIC BLOOD PRESSURE: 154 MMHG | DIASTOLIC BLOOD PRESSURE: 48 MMHG

## 2019-02-05 VITALS — SYSTOLIC BLOOD PRESSURE: 159 MMHG | DIASTOLIC BLOOD PRESSURE: 46 MMHG

## 2019-02-05 VITALS — SYSTOLIC BLOOD PRESSURE: 160 MMHG | DIASTOLIC BLOOD PRESSURE: 59 MMHG

## 2019-02-05 VITALS — SYSTOLIC BLOOD PRESSURE: 156 MMHG | DIASTOLIC BLOOD PRESSURE: 60 MMHG

## 2019-02-05 VITALS — DIASTOLIC BLOOD PRESSURE: 56 MMHG | SYSTOLIC BLOOD PRESSURE: 175 MMHG

## 2019-02-05 VITALS — DIASTOLIC BLOOD PRESSURE: 61 MMHG | SYSTOLIC BLOOD PRESSURE: 169 MMHG

## 2019-02-05 LAB
PLATELET # BLD: 94 K/UL (ref 152–353)
PLATELET # BLD: 94 K/UL (ref 152–353)
POTASSIUM SERPL-SCNC: 4 MMOL/L (ref 3.6–5.2)
SODIUM SERPL-SCNC: 140 MMOL/L (ref 136–145)

## 2019-02-06 VITALS — DIASTOLIC BLOOD PRESSURE: 40 MMHG | SYSTOLIC BLOOD PRESSURE: 142 MMHG

## 2019-02-06 VITALS — TEMPERATURE: 98.2 F | SYSTOLIC BLOOD PRESSURE: 149 MMHG | DIASTOLIC BLOOD PRESSURE: 50 MMHG

## 2019-02-06 VITALS — SYSTOLIC BLOOD PRESSURE: 154 MMHG | TEMPERATURE: 98 F | DIASTOLIC BLOOD PRESSURE: 44 MMHG

## 2019-02-06 VITALS — SYSTOLIC BLOOD PRESSURE: 137 MMHG | DIASTOLIC BLOOD PRESSURE: 53 MMHG

## 2019-02-06 VITALS — SYSTOLIC BLOOD PRESSURE: 137 MMHG | DIASTOLIC BLOOD PRESSURE: 43 MMHG

## 2019-02-06 VITALS — SYSTOLIC BLOOD PRESSURE: 149 MMHG | DIASTOLIC BLOOD PRESSURE: 44 MMHG

## 2019-02-06 VITALS — SYSTOLIC BLOOD PRESSURE: 154 MMHG | DIASTOLIC BLOOD PRESSURE: 48 MMHG

## 2019-02-06 VITALS — SYSTOLIC BLOOD PRESSURE: 140 MMHG | DIASTOLIC BLOOD PRESSURE: 49 MMHG | TEMPERATURE: 97.7 F

## 2019-02-06 VITALS — SYSTOLIC BLOOD PRESSURE: 147 MMHG | DIASTOLIC BLOOD PRESSURE: 43 MMHG

## 2019-02-06 VITALS — SYSTOLIC BLOOD PRESSURE: 136 MMHG | DIASTOLIC BLOOD PRESSURE: 47 MMHG

## 2019-02-06 VITALS — SYSTOLIC BLOOD PRESSURE: 146 MMHG | TEMPERATURE: 98 F | DIASTOLIC BLOOD PRESSURE: 38 MMHG

## 2019-02-06 VITALS — SYSTOLIC BLOOD PRESSURE: 134 MMHG | DIASTOLIC BLOOD PRESSURE: 40 MMHG

## 2019-02-06 VITALS — DIASTOLIC BLOOD PRESSURE: 46 MMHG | TEMPERATURE: 98 F | SYSTOLIC BLOOD PRESSURE: 158 MMHG

## 2019-02-06 VITALS — DIASTOLIC BLOOD PRESSURE: 51 MMHG | SYSTOLIC BLOOD PRESSURE: 153 MMHG

## 2019-02-06 VITALS — SYSTOLIC BLOOD PRESSURE: 150 MMHG | TEMPERATURE: 99.2 F | DIASTOLIC BLOOD PRESSURE: 54 MMHG

## 2019-02-06 VITALS — DIASTOLIC BLOOD PRESSURE: 43 MMHG | SYSTOLIC BLOOD PRESSURE: 141 MMHG

## 2019-02-06 VITALS — SYSTOLIC BLOOD PRESSURE: 155 MMHG | DIASTOLIC BLOOD PRESSURE: 51 MMHG

## 2019-02-06 VITALS — SYSTOLIC BLOOD PRESSURE: 151 MMHG | DIASTOLIC BLOOD PRESSURE: 36 MMHG

## 2019-02-06 VITALS — DIASTOLIC BLOOD PRESSURE: 46 MMHG | SYSTOLIC BLOOD PRESSURE: 163 MMHG

## 2019-02-06 VITALS — SYSTOLIC BLOOD PRESSURE: 150 MMHG | DIASTOLIC BLOOD PRESSURE: 49 MMHG

## 2019-02-06 VITALS — DIASTOLIC BLOOD PRESSURE: 48 MMHG | SYSTOLIC BLOOD PRESSURE: 142 MMHG

## 2019-02-06 VITALS — DIASTOLIC BLOOD PRESSURE: 54 MMHG | TEMPERATURE: 97.9 F | SYSTOLIC BLOOD PRESSURE: 161 MMHG

## 2019-02-06 VITALS — SYSTOLIC BLOOD PRESSURE: 146 MMHG | DIASTOLIC BLOOD PRESSURE: 44 MMHG

## 2019-02-06 LAB
PLATELET # BLD: 77 K/UL (ref 152–353)
POTASSIUM SERPL-SCNC: 3.8 MMOL/L (ref 3.6–5.2)
SODIUM SERPL-SCNC: 137 MMOL/L (ref 136–145)

## 2019-02-07 VITALS — SYSTOLIC BLOOD PRESSURE: 164 MMHG | DIASTOLIC BLOOD PRESSURE: 58 MMHG

## 2019-02-07 VITALS — DIASTOLIC BLOOD PRESSURE: 71 MMHG | SYSTOLIC BLOOD PRESSURE: 106 MMHG

## 2019-02-07 VITALS — SYSTOLIC BLOOD PRESSURE: 154 MMHG | DIASTOLIC BLOOD PRESSURE: 45 MMHG

## 2019-02-07 VITALS — SYSTOLIC BLOOD PRESSURE: 157 MMHG | DIASTOLIC BLOOD PRESSURE: 42 MMHG

## 2019-02-07 VITALS — DIASTOLIC BLOOD PRESSURE: 93 MMHG | SYSTOLIC BLOOD PRESSURE: 154 MMHG | TEMPERATURE: 97.4 F

## 2019-02-07 VITALS — DIASTOLIC BLOOD PRESSURE: 35 MMHG | SYSTOLIC BLOOD PRESSURE: 143 MMHG

## 2019-02-07 VITALS — DIASTOLIC BLOOD PRESSURE: 43 MMHG | SYSTOLIC BLOOD PRESSURE: 153 MMHG

## 2019-02-07 VITALS — SYSTOLIC BLOOD PRESSURE: 160 MMHG | DIASTOLIC BLOOD PRESSURE: 57 MMHG

## 2019-02-07 VITALS — SYSTOLIC BLOOD PRESSURE: 161 MMHG | DIASTOLIC BLOOD PRESSURE: 47 MMHG

## 2019-02-07 VITALS — DIASTOLIC BLOOD PRESSURE: 56 MMHG | SYSTOLIC BLOOD PRESSURE: 172 MMHG

## 2019-02-07 VITALS — DIASTOLIC BLOOD PRESSURE: 40 MMHG | SYSTOLIC BLOOD PRESSURE: 149 MMHG

## 2019-02-07 VITALS — SYSTOLIC BLOOD PRESSURE: 145 MMHG | DIASTOLIC BLOOD PRESSURE: 42 MMHG

## 2019-02-07 VITALS — TEMPERATURE: 97.6 F | SYSTOLIC BLOOD PRESSURE: 155 MMHG | DIASTOLIC BLOOD PRESSURE: 51 MMHG

## 2019-02-07 VITALS — DIASTOLIC BLOOD PRESSURE: 53 MMHG | SYSTOLIC BLOOD PRESSURE: 164 MMHG

## 2019-02-07 VITALS — SYSTOLIC BLOOD PRESSURE: 176 MMHG | TEMPERATURE: 99.4 F | DIASTOLIC BLOOD PRESSURE: 59 MMHG

## 2019-02-07 VITALS — DIASTOLIC BLOOD PRESSURE: 48 MMHG | SYSTOLIC BLOOD PRESSURE: 160 MMHG

## 2019-02-07 VITALS — DIASTOLIC BLOOD PRESSURE: 64 MMHG | SYSTOLIC BLOOD PRESSURE: 102 MMHG

## 2019-02-07 VITALS — DIASTOLIC BLOOD PRESSURE: 40 MMHG | SYSTOLIC BLOOD PRESSURE: 145 MMHG

## 2019-02-07 VITALS — SYSTOLIC BLOOD PRESSURE: 173 MMHG | DIASTOLIC BLOOD PRESSURE: 52 MMHG

## 2019-02-07 LAB
PLATELET # BLD: 67 K/UL (ref 152–353)
PLATELET # BLD: 73 K/UL (ref 152–353)
POTASSIUM SERPL-SCNC: 3.8 MMOL/L (ref 3.6–5.2)
SODIUM SERPL-SCNC: 141 MMOL/L (ref 136–145)

## 2019-02-08 VITALS — SYSTOLIC BLOOD PRESSURE: 157 MMHG | DIASTOLIC BLOOD PRESSURE: 45 MMHG | TEMPERATURE: 99.9 F

## 2019-02-08 VITALS — SYSTOLIC BLOOD PRESSURE: 158 MMHG | DIASTOLIC BLOOD PRESSURE: 54 MMHG

## 2019-02-08 VITALS — DIASTOLIC BLOOD PRESSURE: 55 MMHG | SYSTOLIC BLOOD PRESSURE: 161 MMHG

## 2019-02-08 VITALS — DIASTOLIC BLOOD PRESSURE: 45 MMHG | SYSTOLIC BLOOD PRESSURE: 154 MMHG

## 2019-02-08 VITALS — DIASTOLIC BLOOD PRESSURE: 46 MMHG | SYSTOLIC BLOOD PRESSURE: 165 MMHG

## 2019-02-08 VITALS — DIASTOLIC BLOOD PRESSURE: 49 MMHG | SYSTOLIC BLOOD PRESSURE: 162 MMHG

## 2019-02-08 VITALS — SYSTOLIC BLOOD PRESSURE: 136 MMHG | TEMPERATURE: 98.7 F | DIASTOLIC BLOOD PRESSURE: 41 MMHG

## 2019-02-08 VITALS — SYSTOLIC BLOOD PRESSURE: 155 MMHG | DIASTOLIC BLOOD PRESSURE: 44 MMHG

## 2019-02-08 VITALS — DIASTOLIC BLOOD PRESSURE: 44 MMHG | SYSTOLIC BLOOD PRESSURE: 146 MMHG

## 2019-02-08 VITALS — DIASTOLIC BLOOD PRESSURE: 46 MMHG | SYSTOLIC BLOOD PRESSURE: 163 MMHG

## 2019-02-08 VITALS — SYSTOLIC BLOOD PRESSURE: 155 MMHG | DIASTOLIC BLOOD PRESSURE: 48 MMHG

## 2019-02-08 VITALS — SYSTOLIC BLOOD PRESSURE: 163 MMHG | DIASTOLIC BLOOD PRESSURE: 58 MMHG

## 2019-02-08 VITALS — DIASTOLIC BLOOD PRESSURE: 41 MMHG | SYSTOLIC BLOOD PRESSURE: 168 MMHG

## 2019-02-08 VITALS — SYSTOLIC BLOOD PRESSURE: 156 MMHG | DIASTOLIC BLOOD PRESSURE: 49 MMHG

## 2019-02-08 VITALS — SYSTOLIC BLOOD PRESSURE: 158 MMHG | DIASTOLIC BLOOD PRESSURE: 48 MMHG | TEMPERATURE: 98.5 F

## 2019-02-08 VITALS — SYSTOLIC BLOOD PRESSURE: 156 MMHG | DIASTOLIC BLOOD PRESSURE: 40 MMHG

## 2019-02-08 VITALS — DIASTOLIC BLOOD PRESSURE: 50 MMHG | SYSTOLIC BLOOD PRESSURE: 160 MMHG

## 2019-02-08 VITALS — SYSTOLIC BLOOD PRESSURE: 172 MMHG | DIASTOLIC BLOOD PRESSURE: 55 MMHG

## 2019-02-08 VITALS — DIASTOLIC BLOOD PRESSURE: 46 MMHG | SYSTOLIC BLOOD PRESSURE: 157 MMHG

## 2019-02-08 VITALS — TEMPERATURE: 98.5 F | DIASTOLIC BLOOD PRESSURE: 47 MMHG | SYSTOLIC BLOOD PRESSURE: 143 MMHG

## 2019-02-08 VITALS — SYSTOLIC BLOOD PRESSURE: 165 MMHG | DIASTOLIC BLOOD PRESSURE: 54 MMHG

## 2019-02-08 VITALS — SYSTOLIC BLOOD PRESSURE: 164 MMHG | TEMPERATURE: 99.3 F | DIASTOLIC BLOOD PRESSURE: 42 MMHG

## 2019-02-08 VITALS — SYSTOLIC BLOOD PRESSURE: 165 MMHG | DIASTOLIC BLOOD PRESSURE: 46 MMHG

## 2019-02-08 VITALS — DIASTOLIC BLOOD PRESSURE: 49 MMHG | SYSTOLIC BLOOD PRESSURE: 136 MMHG

## 2019-02-08 LAB
PLATELET # BLD: 64 K/UL (ref 152–353)
POTASSIUM SERPL-SCNC: 3.7 MMOL/L (ref 3.6–5.2)
SODIUM SERPL-SCNC: 140 MMOL/L (ref 136–145)

## 2019-02-09 VITALS — TEMPERATURE: 97.4 F | DIASTOLIC BLOOD PRESSURE: 59 MMHG | SYSTOLIC BLOOD PRESSURE: 161 MMHG

## 2019-02-09 VITALS — TEMPERATURE: 97.9 F | SYSTOLIC BLOOD PRESSURE: 164 MMHG | DIASTOLIC BLOOD PRESSURE: 76 MMHG

## 2019-02-09 VITALS — DIASTOLIC BLOOD PRESSURE: 58 MMHG | SYSTOLIC BLOOD PRESSURE: 172 MMHG

## 2019-02-09 VITALS — DIASTOLIC BLOOD PRESSURE: 51 MMHG | TEMPERATURE: 97.9 F | SYSTOLIC BLOOD PRESSURE: 151 MMHG

## 2019-02-09 VITALS — DIASTOLIC BLOOD PRESSURE: 50 MMHG | TEMPERATURE: 98.1 F | SYSTOLIC BLOOD PRESSURE: 147 MMHG

## 2019-02-09 VITALS — SYSTOLIC BLOOD PRESSURE: 156 MMHG | TEMPERATURE: 98.1 F | DIASTOLIC BLOOD PRESSURE: 52 MMHG

## 2019-02-09 VITALS — SYSTOLIC BLOOD PRESSURE: 162 MMHG | DIASTOLIC BLOOD PRESSURE: 55 MMHG

## 2019-02-09 VITALS — DIASTOLIC BLOOD PRESSURE: 51 MMHG | TEMPERATURE: 97.9 F | SYSTOLIC BLOOD PRESSURE: 160 MMHG

## 2019-02-09 VITALS — DIASTOLIC BLOOD PRESSURE: 70 MMHG | SYSTOLIC BLOOD PRESSURE: 158 MMHG

## 2019-02-09 VITALS — SYSTOLIC BLOOD PRESSURE: 184 MMHG | DIASTOLIC BLOOD PRESSURE: 70 MMHG

## 2019-02-09 VITALS — SYSTOLIC BLOOD PRESSURE: 170 MMHG | DIASTOLIC BLOOD PRESSURE: 63 MMHG

## 2019-02-09 VITALS — SYSTOLIC BLOOD PRESSURE: 143 MMHG | TEMPERATURE: 98.2 F | DIASTOLIC BLOOD PRESSURE: 45 MMHG

## 2019-02-09 VITALS — DIASTOLIC BLOOD PRESSURE: 55 MMHG | SYSTOLIC BLOOD PRESSURE: 164 MMHG

## 2019-02-09 VITALS — TEMPERATURE: 98.5 F | DIASTOLIC BLOOD PRESSURE: 54 MMHG | SYSTOLIC BLOOD PRESSURE: 164 MMHG

## 2019-02-09 VITALS — SYSTOLIC BLOOD PRESSURE: 166 MMHG | DIASTOLIC BLOOD PRESSURE: 57 MMHG

## 2019-02-09 VITALS — SYSTOLIC BLOOD PRESSURE: 155 MMHG | DIASTOLIC BLOOD PRESSURE: 54 MMHG

## 2019-02-09 VITALS — DIASTOLIC BLOOD PRESSURE: 56 MMHG | SYSTOLIC BLOOD PRESSURE: 160 MMHG

## 2019-02-09 VITALS — SYSTOLIC BLOOD PRESSURE: 153 MMHG | DIASTOLIC BLOOD PRESSURE: 49 MMHG

## 2019-02-09 VITALS — TEMPERATURE: 98.1 F | DIASTOLIC BLOOD PRESSURE: 51 MMHG | SYSTOLIC BLOOD PRESSURE: 159 MMHG

## 2019-02-09 VITALS — SYSTOLIC BLOOD PRESSURE: 157 MMHG | TEMPERATURE: 97.9 F | DIASTOLIC BLOOD PRESSURE: 56 MMHG

## 2019-02-09 VITALS — DIASTOLIC BLOOD PRESSURE: 79 MMHG | SYSTOLIC BLOOD PRESSURE: 168 MMHG

## 2019-02-09 VITALS — SYSTOLIC BLOOD PRESSURE: 160 MMHG | DIASTOLIC BLOOD PRESSURE: 60 MMHG

## 2019-02-09 VITALS — DIASTOLIC BLOOD PRESSURE: 44 MMHG | SYSTOLIC BLOOD PRESSURE: 143 MMHG

## 2019-02-09 VITALS — DIASTOLIC BLOOD PRESSURE: 54 MMHG | SYSTOLIC BLOOD PRESSURE: 158 MMHG

## 2019-02-09 LAB
PLATELET # BLD: 80 K/UL (ref 152–353)
POTASSIUM SERPL-SCNC: 3.9 MMOL/L (ref 3.6–5.2)
SODIUM SERPL-SCNC: 136 MMOL/L (ref 136–145)

## 2019-02-10 VITALS — SYSTOLIC BLOOD PRESSURE: 155 MMHG | DIASTOLIC BLOOD PRESSURE: 62 MMHG

## 2019-02-10 VITALS — SYSTOLIC BLOOD PRESSURE: 166 MMHG | TEMPERATURE: 97.7 F | DIASTOLIC BLOOD PRESSURE: 55 MMHG

## 2019-02-10 VITALS — DIASTOLIC BLOOD PRESSURE: 55 MMHG | SYSTOLIC BLOOD PRESSURE: 160 MMHG

## 2019-02-10 VITALS — SYSTOLIC BLOOD PRESSURE: 135 MMHG | DIASTOLIC BLOOD PRESSURE: 54 MMHG

## 2019-02-10 VITALS — SYSTOLIC BLOOD PRESSURE: 151 MMHG | DIASTOLIC BLOOD PRESSURE: 83 MMHG

## 2019-02-10 VITALS — SYSTOLIC BLOOD PRESSURE: 154 MMHG | DIASTOLIC BLOOD PRESSURE: 56 MMHG

## 2019-02-10 VITALS — DIASTOLIC BLOOD PRESSURE: 58 MMHG | SYSTOLIC BLOOD PRESSURE: 169 MMHG

## 2019-02-10 VITALS — DIASTOLIC BLOOD PRESSURE: 49 MMHG | SYSTOLIC BLOOD PRESSURE: 148 MMHG | TEMPERATURE: 98.2 F

## 2019-02-10 VITALS — DIASTOLIC BLOOD PRESSURE: 45 MMHG | TEMPERATURE: 98.2 F | SYSTOLIC BLOOD PRESSURE: 120 MMHG

## 2019-02-10 VITALS — SYSTOLIC BLOOD PRESSURE: 143 MMHG | DIASTOLIC BLOOD PRESSURE: 33 MMHG

## 2019-02-10 VITALS — DIASTOLIC BLOOD PRESSURE: 55 MMHG | SYSTOLIC BLOOD PRESSURE: 146 MMHG

## 2019-02-10 VITALS — DIASTOLIC BLOOD PRESSURE: 53 MMHG | SYSTOLIC BLOOD PRESSURE: 92 MMHG

## 2019-02-10 VITALS — SYSTOLIC BLOOD PRESSURE: 115 MMHG | DIASTOLIC BLOOD PRESSURE: 70 MMHG

## 2019-02-10 VITALS — DIASTOLIC BLOOD PRESSURE: 52 MMHG | SYSTOLIC BLOOD PRESSURE: 166 MMHG

## 2019-02-10 VITALS — DIASTOLIC BLOOD PRESSURE: 58 MMHG | SYSTOLIC BLOOD PRESSURE: 151 MMHG

## 2019-02-10 VITALS — SYSTOLIC BLOOD PRESSURE: 162 MMHG | DIASTOLIC BLOOD PRESSURE: 61 MMHG | TEMPERATURE: 98.2 F

## 2019-02-10 VITALS — DIASTOLIC BLOOD PRESSURE: 63 MMHG | SYSTOLIC BLOOD PRESSURE: 156 MMHG

## 2019-02-10 VITALS — SYSTOLIC BLOOD PRESSURE: 161 MMHG | TEMPERATURE: 98 F | DIASTOLIC BLOOD PRESSURE: 59 MMHG

## 2019-02-10 VITALS — DIASTOLIC BLOOD PRESSURE: 60 MMHG | SYSTOLIC BLOOD PRESSURE: 162 MMHG

## 2019-02-10 VITALS — SYSTOLIC BLOOD PRESSURE: 155 MMHG | DIASTOLIC BLOOD PRESSURE: 51 MMHG

## 2019-02-10 VITALS — DIASTOLIC BLOOD PRESSURE: 69 MMHG | SYSTOLIC BLOOD PRESSURE: 147 MMHG

## 2019-02-10 VITALS — SYSTOLIC BLOOD PRESSURE: 149 MMHG | DIASTOLIC BLOOD PRESSURE: 56 MMHG

## 2019-02-10 VITALS — SYSTOLIC BLOOD PRESSURE: 160 MMHG | DIASTOLIC BLOOD PRESSURE: 55 MMHG

## 2019-02-10 LAB
PLATELET # BLD: 120 K/UL (ref 152–353)
POTASSIUM SERPL-SCNC: 4 MMOL/L (ref 3.6–5.2)
SODIUM SERPL-SCNC: 135 MMOL/L (ref 136–145)

## 2019-02-11 VITALS — DIASTOLIC BLOOD PRESSURE: 64 MMHG | TEMPERATURE: 97.7 F | SYSTOLIC BLOOD PRESSURE: 168 MMHG

## 2019-02-11 VITALS — SYSTOLIC BLOOD PRESSURE: 175 MMHG | DIASTOLIC BLOOD PRESSURE: 53 MMHG

## 2019-02-11 VITALS — DIASTOLIC BLOOD PRESSURE: 80 MMHG | SYSTOLIC BLOOD PRESSURE: 160 MMHG | TEMPERATURE: 98.4 F

## 2019-02-11 VITALS — DIASTOLIC BLOOD PRESSURE: 66 MMHG | SYSTOLIC BLOOD PRESSURE: 176 MMHG

## 2019-02-11 VITALS — DIASTOLIC BLOOD PRESSURE: 54 MMHG | SYSTOLIC BLOOD PRESSURE: 171 MMHG

## 2019-02-11 VITALS — DIASTOLIC BLOOD PRESSURE: 52 MMHG | TEMPERATURE: 98.2 F | SYSTOLIC BLOOD PRESSURE: 157 MMHG

## 2019-02-11 VITALS — DIASTOLIC BLOOD PRESSURE: 55 MMHG | SYSTOLIC BLOOD PRESSURE: 158 MMHG

## 2019-02-11 VITALS — SYSTOLIC BLOOD PRESSURE: 148 MMHG | DIASTOLIC BLOOD PRESSURE: 56 MMHG

## 2019-02-11 VITALS — SYSTOLIC BLOOD PRESSURE: 169 MMHG | DIASTOLIC BLOOD PRESSURE: 62 MMHG

## 2019-02-11 VITALS — DIASTOLIC BLOOD PRESSURE: 50 MMHG | TEMPERATURE: 98 F | SYSTOLIC BLOOD PRESSURE: 177 MMHG

## 2019-02-11 VITALS — SYSTOLIC BLOOD PRESSURE: 138 MMHG | DIASTOLIC BLOOD PRESSURE: 42 MMHG | TEMPERATURE: 98.7 F

## 2019-02-11 VITALS — SYSTOLIC BLOOD PRESSURE: 150 MMHG | DIASTOLIC BLOOD PRESSURE: 53 MMHG

## 2019-02-11 VITALS — DIASTOLIC BLOOD PRESSURE: 50 MMHG | SYSTOLIC BLOOD PRESSURE: 155 MMHG

## 2019-02-11 VITALS — SYSTOLIC BLOOD PRESSURE: 175 MMHG | DIASTOLIC BLOOD PRESSURE: 56 MMHG

## 2019-02-11 VITALS — SYSTOLIC BLOOD PRESSURE: 163 MMHG | DIASTOLIC BLOOD PRESSURE: 53 MMHG

## 2019-02-11 VITALS — DIASTOLIC BLOOD PRESSURE: 63 MMHG | SYSTOLIC BLOOD PRESSURE: 172 MMHG

## 2019-02-11 VITALS — DIASTOLIC BLOOD PRESSURE: 57 MMHG | SYSTOLIC BLOOD PRESSURE: 156 MMHG

## 2019-02-11 VITALS — SYSTOLIC BLOOD PRESSURE: 167 MMHG | DIASTOLIC BLOOD PRESSURE: 52 MMHG

## 2019-02-11 VITALS — SYSTOLIC BLOOD PRESSURE: 166 MMHG | DIASTOLIC BLOOD PRESSURE: 64 MMHG

## 2019-02-11 VITALS — TEMPERATURE: 99.3 F | SYSTOLIC BLOOD PRESSURE: 149 MMHG | DIASTOLIC BLOOD PRESSURE: 42 MMHG

## 2019-02-11 VITALS — DIASTOLIC BLOOD PRESSURE: 59 MMHG | SYSTOLIC BLOOD PRESSURE: 155 MMHG

## 2019-02-11 LAB
PLATELET # BLD: 146 K/UL (ref 152–353)
POTASSIUM SERPL-SCNC: 3.6 MMOL/L (ref 3.6–5.2)
SODIUM SERPL-SCNC: 140 MMOL/L (ref 136–145)

## 2019-02-12 VITALS — SYSTOLIC BLOOD PRESSURE: 176 MMHG | TEMPERATURE: 98.1 F | DIASTOLIC BLOOD PRESSURE: 60 MMHG

## 2019-02-12 VITALS — SYSTOLIC BLOOD PRESSURE: 149 MMHG | DIASTOLIC BLOOD PRESSURE: 58 MMHG

## 2019-02-12 VITALS — DIASTOLIC BLOOD PRESSURE: 51 MMHG | SYSTOLIC BLOOD PRESSURE: 160 MMHG

## 2019-02-12 VITALS — DIASTOLIC BLOOD PRESSURE: 57 MMHG | TEMPERATURE: 99 F | SYSTOLIC BLOOD PRESSURE: 148 MMHG

## 2019-02-12 VITALS — SYSTOLIC BLOOD PRESSURE: 145 MMHG | DIASTOLIC BLOOD PRESSURE: 41 MMHG | TEMPERATURE: 98.4 F

## 2019-02-12 VITALS — DIASTOLIC BLOOD PRESSURE: 51 MMHG | SYSTOLIC BLOOD PRESSURE: 135 MMHG

## 2019-02-12 VITALS — SYSTOLIC BLOOD PRESSURE: 163 MMHG | DIASTOLIC BLOOD PRESSURE: 55 MMHG | TEMPERATURE: 97.9 F

## 2019-02-12 VITALS — DIASTOLIC BLOOD PRESSURE: 75 MMHG | SYSTOLIC BLOOD PRESSURE: 157 MMHG

## 2019-02-12 VITALS — SYSTOLIC BLOOD PRESSURE: 132 MMHG | DIASTOLIC BLOOD PRESSURE: 54 MMHG

## 2019-02-12 VITALS — DIASTOLIC BLOOD PRESSURE: 54 MMHG | SYSTOLIC BLOOD PRESSURE: 169 MMHG

## 2019-02-12 VITALS — SYSTOLIC BLOOD PRESSURE: 138 MMHG | DIASTOLIC BLOOD PRESSURE: 60 MMHG

## 2019-02-12 VITALS — DIASTOLIC BLOOD PRESSURE: 53 MMHG | SYSTOLIC BLOOD PRESSURE: 171 MMHG | TEMPERATURE: 98.6 F

## 2019-02-12 VITALS — SYSTOLIC BLOOD PRESSURE: 155 MMHG | DIASTOLIC BLOOD PRESSURE: 54 MMHG

## 2019-02-12 VITALS — DIASTOLIC BLOOD PRESSURE: 50 MMHG | SYSTOLIC BLOOD PRESSURE: 175 MMHG

## 2019-02-12 VITALS — SYSTOLIC BLOOD PRESSURE: 166 MMHG | DIASTOLIC BLOOD PRESSURE: 56 MMHG

## 2019-02-12 VITALS — DIASTOLIC BLOOD PRESSURE: 53 MMHG | SYSTOLIC BLOOD PRESSURE: 164 MMHG

## 2019-02-12 VITALS — DIASTOLIC BLOOD PRESSURE: 56 MMHG | SYSTOLIC BLOOD PRESSURE: 170 MMHG

## 2019-02-12 VITALS — SYSTOLIC BLOOD PRESSURE: 158 MMHG | DIASTOLIC BLOOD PRESSURE: 76 MMHG

## 2019-02-12 VITALS — DIASTOLIC BLOOD PRESSURE: 56 MMHG | SYSTOLIC BLOOD PRESSURE: 131 MMHG

## 2019-02-12 VITALS — DIASTOLIC BLOOD PRESSURE: 43 MMHG | SYSTOLIC BLOOD PRESSURE: 131 MMHG

## 2019-02-12 VITALS — SYSTOLIC BLOOD PRESSURE: 172 MMHG | DIASTOLIC BLOOD PRESSURE: 56 MMHG

## 2019-02-12 VITALS — DIASTOLIC BLOOD PRESSURE: 48 MMHG | SYSTOLIC BLOOD PRESSURE: 146 MMHG

## 2019-02-12 VITALS — SYSTOLIC BLOOD PRESSURE: 152 MMHG | DIASTOLIC BLOOD PRESSURE: 45 MMHG

## 2019-02-12 LAB
PLATELET # BLD: 182 K/UL (ref 152–353)
POTASSIUM SERPL-SCNC: 3.2 MMOL/L (ref 3.6–5.2)
SODIUM SERPL-SCNC: 141 MMOL/L (ref 136–145)

## 2019-02-12 PROCEDURE — 0DH67UZ INSERTION OF FEEDING DEVICE INTO STOMACH, VIA NATURAL OR ARTIFICIAL OPENING: ICD-10-PCS | Performed by: INTERNAL MEDICINE

## 2019-02-13 VITALS — SYSTOLIC BLOOD PRESSURE: 121 MMHG | DIASTOLIC BLOOD PRESSURE: 72 MMHG | TEMPERATURE: 98.7 F

## 2019-02-13 VITALS — SYSTOLIC BLOOD PRESSURE: 149 MMHG | DIASTOLIC BLOOD PRESSURE: 53 MMHG

## 2019-02-13 VITALS — DIASTOLIC BLOOD PRESSURE: 53 MMHG | SYSTOLIC BLOOD PRESSURE: 149 MMHG

## 2019-02-13 VITALS — DIASTOLIC BLOOD PRESSURE: 51 MMHG | SYSTOLIC BLOOD PRESSURE: 159 MMHG

## 2019-02-13 VITALS — DIASTOLIC BLOOD PRESSURE: 51 MMHG | TEMPERATURE: 98.1 F | SYSTOLIC BLOOD PRESSURE: 156 MMHG

## 2019-02-13 VITALS — DIASTOLIC BLOOD PRESSURE: 48 MMHG | TEMPERATURE: 97.2 F | SYSTOLIC BLOOD PRESSURE: 149 MMHG

## 2019-02-13 VITALS — DIASTOLIC BLOOD PRESSURE: 77 MMHG | SYSTOLIC BLOOD PRESSURE: 141 MMHG

## 2019-02-13 VITALS — DIASTOLIC BLOOD PRESSURE: 42 MMHG | SYSTOLIC BLOOD PRESSURE: 147 MMHG

## 2019-02-13 VITALS — TEMPERATURE: 97.7 F | DIASTOLIC BLOOD PRESSURE: 51 MMHG | SYSTOLIC BLOOD PRESSURE: 157 MMHG

## 2019-02-13 VITALS — SYSTOLIC BLOOD PRESSURE: 125 MMHG | DIASTOLIC BLOOD PRESSURE: 39 MMHG

## 2019-02-13 VITALS — DIASTOLIC BLOOD PRESSURE: 52 MMHG | SYSTOLIC BLOOD PRESSURE: 138 MMHG

## 2019-02-13 VITALS — SYSTOLIC BLOOD PRESSURE: 148 MMHG | DIASTOLIC BLOOD PRESSURE: 49 MMHG

## 2019-02-13 VITALS — DIASTOLIC BLOOD PRESSURE: 50 MMHG | SYSTOLIC BLOOD PRESSURE: 157 MMHG

## 2019-02-13 VITALS — DIASTOLIC BLOOD PRESSURE: 55 MMHG | SYSTOLIC BLOOD PRESSURE: 147 MMHG

## 2019-02-13 VITALS — SYSTOLIC BLOOD PRESSURE: 158 MMHG | DIASTOLIC BLOOD PRESSURE: 55 MMHG

## 2019-02-13 VITALS — SYSTOLIC BLOOD PRESSURE: 148 MMHG | DIASTOLIC BLOOD PRESSURE: 53 MMHG

## 2019-02-13 VITALS — DIASTOLIC BLOOD PRESSURE: 61 MMHG | TEMPERATURE: 98.2 F | SYSTOLIC BLOOD PRESSURE: 166 MMHG

## 2019-02-13 VITALS — SYSTOLIC BLOOD PRESSURE: 148 MMHG | DIASTOLIC BLOOD PRESSURE: 50 MMHG

## 2019-02-13 VITALS — SYSTOLIC BLOOD PRESSURE: 172 MMHG | DIASTOLIC BLOOD PRESSURE: 57 MMHG

## 2019-02-13 LAB
PLATELET # BLD: 224 K/UL (ref 152–353)
POTASSIUM SERPL-SCNC: 3.4 MMOL/L (ref 3.6–5.2)
SODIUM SERPL-SCNC: 144 MMOL/L (ref 136–145)

## 2019-02-14 VITALS — DIASTOLIC BLOOD PRESSURE: 60 MMHG | SYSTOLIC BLOOD PRESSURE: 154 MMHG

## 2019-02-14 VITALS — SYSTOLIC BLOOD PRESSURE: 159 MMHG | DIASTOLIC BLOOD PRESSURE: 52 MMHG

## 2019-02-14 VITALS — SYSTOLIC BLOOD PRESSURE: 173 MMHG | DIASTOLIC BLOOD PRESSURE: 45 MMHG

## 2019-02-14 VITALS — DIASTOLIC BLOOD PRESSURE: 55 MMHG | SYSTOLIC BLOOD PRESSURE: 170 MMHG

## 2019-02-14 VITALS — DIASTOLIC BLOOD PRESSURE: 53 MMHG | SYSTOLIC BLOOD PRESSURE: 161 MMHG

## 2019-02-14 VITALS — TEMPERATURE: 98.7 F | DIASTOLIC BLOOD PRESSURE: 63 MMHG | SYSTOLIC BLOOD PRESSURE: 165 MMHG

## 2019-02-14 VITALS — TEMPERATURE: 98.6 F | SYSTOLIC BLOOD PRESSURE: 177 MMHG | DIASTOLIC BLOOD PRESSURE: 57 MMHG

## 2019-02-14 VITALS — SYSTOLIC BLOOD PRESSURE: 169 MMHG | DIASTOLIC BLOOD PRESSURE: 58 MMHG

## 2019-02-14 VITALS — SYSTOLIC BLOOD PRESSURE: 169 MMHG | DIASTOLIC BLOOD PRESSURE: 57 MMHG | TEMPERATURE: 98.9 F

## 2019-02-14 VITALS — DIASTOLIC BLOOD PRESSURE: 40 MMHG | TEMPERATURE: 97.1 F | SYSTOLIC BLOOD PRESSURE: 130 MMHG

## 2019-02-14 VITALS — TEMPERATURE: 97.5 F | SYSTOLIC BLOOD PRESSURE: 155 MMHG | DIASTOLIC BLOOD PRESSURE: 51 MMHG

## 2019-02-14 VITALS — SYSTOLIC BLOOD PRESSURE: 163 MMHG | DIASTOLIC BLOOD PRESSURE: 58 MMHG

## 2019-02-14 VITALS — DIASTOLIC BLOOD PRESSURE: 60 MMHG | SYSTOLIC BLOOD PRESSURE: 149 MMHG

## 2019-02-14 VITALS — SYSTOLIC BLOOD PRESSURE: 156 MMHG | DIASTOLIC BLOOD PRESSURE: 52 MMHG

## 2019-02-14 VITALS — TEMPERATURE: 98.1 F

## 2019-02-14 LAB
PLATELET # BLD: 209 K/UL (ref 152–353)
POTASSIUM SERPL-SCNC: 3.7 MMOL/L (ref 3.6–5.2)
SODIUM SERPL-SCNC: 146 MMOL/L (ref 136–145)

## 2019-02-15 VITALS — SYSTOLIC BLOOD PRESSURE: 163 MMHG | DIASTOLIC BLOOD PRESSURE: 54 MMHG

## 2019-02-15 VITALS — TEMPERATURE: 98.1 F | SYSTOLIC BLOOD PRESSURE: 154 MMHG | DIASTOLIC BLOOD PRESSURE: 54 MMHG

## 2019-02-15 VITALS — DIASTOLIC BLOOD PRESSURE: 62 MMHG | SYSTOLIC BLOOD PRESSURE: 161 MMHG | TEMPERATURE: 98.4 F

## 2019-02-15 VITALS — TEMPERATURE: 98.3 F | SYSTOLIC BLOOD PRESSURE: 177 MMHG | DIASTOLIC BLOOD PRESSURE: 57 MMHG

## 2019-02-15 VITALS — SYSTOLIC BLOOD PRESSURE: 143 MMHG | DIASTOLIC BLOOD PRESSURE: 52 MMHG

## 2019-02-15 VITALS — TEMPERATURE: 98 F | SYSTOLIC BLOOD PRESSURE: 168 MMHG | DIASTOLIC BLOOD PRESSURE: 52 MMHG

## 2019-02-15 VITALS — SYSTOLIC BLOOD PRESSURE: 152 MMHG | DIASTOLIC BLOOD PRESSURE: 54 MMHG

## 2019-02-15 VITALS — DIASTOLIC BLOOD PRESSURE: 58 MMHG | SYSTOLIC BLOOD PRESSURE: 163 MMHG

## 2019-02-15 VITALS — SYSTOLIC BLOOD PRESSURE: 160 MMHG | DIASTOLIC BLOOD PRESSURE: 50 MMHG

## 2019-02-15 VITALS — TEMPERATURE: 98.8 F | SYSTOLIC BLOOD PRESSURE: 165 MMHG | DIASTOLIC BLOOD PRESSURE: 56 MMHG

## 2019-02-15 VITALS — DIASTOLIC BLOOD PRESSURE: 59 MMHG | SYSTOLIC BLOOD PRESSURE: 179 MMHG

## 2019-02-15 VITALS — DIASTOLIC BLOOD PRESSURE: 49 MMHG | SYSTOLIC BLOOD PRESSURE: 161 MMHG

## 2019-02-15 LAB
PLATELET # BLD: 220 K/UL (ref 152–353)
POTASSIUM SERPL-SCNC: 3 MMOL/L (ref 3.6–5.2)
SODIUM SERPL-SCNC: 143 MMOL/L (ref 136–145)

## 2019-02-16 VITALS — DIASTOLIC BLOOD PRESSURE: 53 MMHG | SYSTOLIC BLOOD PRESSURE: 158 MMHG

## 2019-02-16 VITALS — DIASTOLIC BLOOD PRESSURE: 63 MMHG | SYSTOLIC BLOOD PRESSURE: 175 MMHG

## 2019-02-16 VITALS — SYSTOLIC BLOOD PRESSURE: 149 MMHG | TEMPERATURE: 98.4 F | DIASTOLIC BLOOD PRESSURE: 49 MMHG

## 2019-02-16 VITALS — DIASTOLIC BLOOD PRESSURE: 47 MMHG | SYSTOLIC BLOOD PRESSURE: 146 MMHG

## 2019-02-16 VITALS — TEMPERATURE: 97.9 F | DIASTOLIC BLOOD PRESSURE: 48 MMHG | SYSTOLIC BLOOD PRESSURE: 165 MMHG

## 2019-02-16 VITALS — SYSTOLIC BLOOD PRESSURE: 178 MMHG | DIASTOLIC BLOOD PRESSURE: 73 MMHG

## 2019-02-16 VITALS — DIASTOLIC BLOOD PRESSURE: 55 MMHG | SYSTOLIC BLOOD PRESSURE: 174 MMHG

## 2019-02-16 VITALS — DIASTOLIC BLOOD PRESSURE: 52 MMHG | SYSTOLIC BLOOD PRESSURE: 143 MMHG

## 2019-02-16 VITALS — SYSTOLIC BLOOD PRESSURE: 143 MMHG | TEMPERATURE: 97.8 F | DIASTOLIC BLOOD PRESSURE: 42 MMHG

## 2019-02-16 VITALS — SYSTOLIC BLOOD PRESSURE: 158 MMHG | TEMPERATURE: 97.7 F | DIASTOLIC BLOOD PRESSURE: 55 MMHG

## 2019-02-16 VITALS — SYSTOLIC BLOOD PRESSURE: 156 MMHG | DIASTOLIC BLOOD PRESSURE: 62 MMHG

## 2019-02-16 VITALS — SYSTOLIC BLOOD PRESSURE: 166 MMHG | TEMPERATURE: 98.2 F | DIASTOLIC BLOOD PRESSURE: 44 MMHG

## 2019-02-16 VITALS — SYSTOLIC BLOOD PRESSURE: 155 MMHG | DIASTOLIC BLOOD PRESSURE: 54 MMHG

## 2019-02-16 VITALS — DIASTOLIC BLOOD PRESSURE: 56 MMHG | SYSTOLIC BLOOD PRESSURE: 179 MMHG

## 2019-02-16 VITALS — DIASTOLIC BLOOD PRESSURE: 50 MMHG | SYSTOLIC BLOOD PRESSURE: 170 MMHG

## 2019-02-16 VITALS — DIASTOLIC BLOOD PRESSURE: 56 MMHG | SYSTOLIC BLOOD PRESSURE: 143 MMHG

## 2019-02-16 VITALS — DIASTOLIC BLOOD PRESSURE: 60 MMHG | SYSTOLIC BLOOD PRESSURE: 147 MMHG

## 2019-02-16 VITALS — TEMPERATURE: 98.2 F

## 2019-02-16 LAB
PLATELET # BLD: 219 K/UL (ref 152–353)
POTASSIUM SERPL-SCNC: 3.7 MMOL/L (ref 3.6–5.2)
SODIUM SERPL-SCNC: 145 MMOL/L (ref 136–145)

## 2019-02-17 VITALS — SYSTOLIC BLOOD PRESSURE: 163 MMHG | TEMPERATURE: 98.4 F | DIASTOLIC BLOOD PRESSURE: 55 MMHG

## 2019-02-17 VITALS — SYSTOLIC BLOOD PRESSURE: 154 MMHG | DIASTOLIC BLOOD PRESSURE: 42 MMHG

## 2019-02-17 VITALS — SYSTOLIC BLOOD PRESSURE: 168 MMHG | DIASTOLIC BLOOD PRESSURE: 70 MMHG

## 2019-02-17 VITALS — TEMPERATURE: 98.3 F | DIASTOLIC BLOOD PRESSURE: 58 MMHG | SYSTOLIC BLOOD PRESSURE: 173 MMHG

## 2019-02-17 VITALS — TEMPERATURE: 98.3 F

## 2019-02-17 VITALS — DIASTOLIC BLOOD PRESSURE: 52 MMHG | SYSTOLIC BLOOD PRESSURE: 174 MMHG | TEMPERATURE: 98.8 F

## 2019-02-17 VITALS — SYSTOLIC BLOOD PRESSURE: 178 MMHG | DIASTOLIC BLOOD PRESSURE: 60 MMHG | TEMPERATURE: 98 F

## 2019-02-17 VITALS — SYSTOLIC BLOOD PRESSURE: 155 MMHG | DIASTOLIC BLOOD PRESSURE: 55 MMHG

## 2019-02-17 VITALS — DIASTOLIC BLOOD PRESSURE: 57 MMHG | SYSTOLIC BLOOD PRESSURE: 167 MMHG

## 2019-02-17 VITALS — SYSTOLIC BLOOD PRESSURE: 153 MMHG | DIASTOLIC BLOOD PRESSURE: 63 MMHG

## 2019-02-17 LAB
PLATELET # BLD: 185 K/UL (ref 152–353)
POTASSIUM SERPL-SCNC: 3.7 MMOL/L (ref 3.6–5.2)
SODIUM SERPL-SCNC: 144 MMOL/L (ref 136–145)

## 2019-02-18 VITALS — SYSTOLIC BLOOD PRESSURE: 158 MMHG | TEMPERATURE: 98.2 F | DIASTOLIC BLOOD PRESSURE: 54 MMHG

## 2019-02-18 VITALS — TEMPERATURE: 98 F | DIASTOLIC BLOOD PRESSURE: 50 MMHG | SYSTOLIC BLOOD PRESSURE: 166 MMHG

## 2019-02-18 VITALS — SYSTOLIC BLOOD PRESSURE: 173 MMHG | DIASTOLIC BLOOD PRESSURE: 61 MMHG

## 2019-02-18 VITALS — SYSTOLIC BLOOD PRESSURE: 166 MMHG | DIASTOLIC BLOOD PRESSURE: 55 MMHG | TEMPERATURE: 97.2 F

## 2019-02-18 VITALS — SYSTOLIC BLOOD PRESSURE: 198 MMHG | DIASTOLIC BLOOD PRESSURE: 62 MMHG

## 2019-02-18 VITALS — SYSTOLIC BLOOD PRESSURE: 144 MMHG | TEMPERATURE: 97.7 F | DIASTOLIC BLOOD PRESSURE: 104 MMHG

## 2019-02-18 VITALS — SYSTOLIC BLOOD PRESSURE: 165 MMHG | TEMPERATURE: 97.7 F | DIASTOLIC BLOOD PRESSURE: 55 MMHG

## 2019-02-18 LAB
PLATELET # BLD: 196 K/UL (ref 152–353)
POTASSIUM SERPL-SCNC: 3.7 MMOL/L (ref 3.6–5.2)
SODIUM SERPL-SCNC: 141 MMOL/L (ref 136–145)

## 2019-02-19 VITALS — SYSTOLIC BLOOD PRESSURE: 96 MMHG | TEMPERATURE: 98.3 F | DIASTOLIC BLOOD PRESSURE: 63 MMHG

## 2019-02-19 VITALS — TEMPERATURE: 98.2 F | SYSTOLIC BLOOD PRESSURE: 174 MMHG | DIASTOLIC BLOOD PRESSURE: 61 MMHG

## 2019-02-19 VITALS — DIASTOLIC BLOOD PRESSURE: 60 MMHG | TEMPERATURE: 98.8 F | SYSTOLIC BLOOD PRESSURE: 161 MMHG

## 2019-02-19 VITALS — DIASTOLIC BLOOD PRESSURE: 50 MMHG | TEMPERATURE: 98.8 F | SYSTOLIC BLOOD PRESSURE: 150 MMHG

## 2019-02-19 VITALS — DIASTOLIC BLOOD PRESSURE: 62 MMHG | SYSTOLIC BLOOD PRESSURE: 173 MMHG | TEMPERATURE: 97.5 F

## 2019-02-19 VITALS — SYSTOLIC BLOOD PRESSURE: 170 MMHG | TEMPERATURE: 97.4 F | DIASTOLIC BLOOD PRESSURE: 47 MMHG

## 2019-02-20 VITALS — DIASTOLIC BLOOD PRESSURE: 56 MMHG | SYSTOLIC BLOOD PRESSURE: 167 MMHG | TEMPERATURE: 97.9 F

## 2019-02-20 VITALS — SYSTOLIC BLOOD PRESSURE: 160 MMHG | TEMPERATURE: 96.8 F | DIASTOLIC BLOOD PRESSURE: 68 MMHG

## 2019-02-20 VITALS — DIASTOLIC BLOOD PRESSURE: 57 MMHG | TEMPERATURE: 98.1 F | SYSTOLIC BLOOD PRESSURE: 96 MMHG

## 2019-02-20 VITALS — DIASTOLIC BLOOD PRESSURE: 61 MMHG | SYSTOLIC BLOOD PRESSURE: 157 MMHG | TEMPERATURE: 97.1 F

## 2019-02-20 VITALS — DIASTOLIC BLOOD PRESSURE: 59 MMHG | SYSTOLIC BLOOD PRESSURE: 173 MMHG | TEMPERATURE: 98 F

## 2019-02-20 VITALS — SYSTOLIC BLOOD PRESSURE: 169 MMHG | DIASTOLIC BLOOD PRESSURE: 54 MMHG | TEMPERATURE: 98.5 F

## 2019-02-21 VITALS — DIASTOLIC BLOOD PRESSURE: 65 MMHG | TEMPERATURE: 98 F | SYSTOLIC BLOOD PRESSURE: 167 MMHG

## 2019-02-21 VITALS — TEMPERATURE: 97.6 F | SYSTOLIC BLOOD PRESSURE: 107 MMHG | DIASTOLIC BLOOD PRESSURE: 56 MMHG

## 2019-02-21 VITALS — DIASTOLIC BLOOD PRESSURE: 59 MMHG | TEMPERATURE: 97.9 F | SYSTOLIC BLOOD PRESSURE: 169 MMHG

## 2019-02-21 VITALS — DIASTOLIC BLOOD PRESSURE: 94 MMHG | TEMPERATURE: 98.8 F | SYSTOLIC BLOOD PRESSURE: 155 MMHG

## 2019-02-21 VITALS — TEMPERATURE: 98.8 F | SYSTOLIC BLOOD PRESSURE: 160 MMHG | DIASTOLIC BLOOD PRESSURE: 75 MMHG

## 2019-02-21 LAB
PLATELET # BLD: 118 K/UL (ref 152–353)
POTASSIUM SERPL-SCNC: 3.8 MMOL/L (ref 3.6–5.2)
SODIUM SERPL-SCNC: 138 MMOL/L (ref 136–145)